# Patient Record
Sex: FEMALE | Race: WHITE | NOT HISPANIC OR LATINO | Employment: OTHER | URBAN - METROPOLITAN AREA
[De-identification: names, ages, dates, MRNs, and addresses within clinical notes are randomized per-mention and may not be internally consistent; named-entity substitution may affect disease eponyms.]

---

## 2023-02-22 LAB
ALANINE AMINOTRANSFERASE (SGPT) (U/L) IN SER/PLAS: 13 U/L (ref 7–45)
ALANINE AMINOTRANSFERASE (SGPT) (U/L) IN SER/PLAS: 15 U/L (ref 7–45)
ALBUMIN (G/DL) IN SER/PLAS: 4.3 G/DL (ref 3.4–5)
ALBUMIN (G/DL) IN SER/PLAS: 4.4 G/DL (ref 3.4–5)
ALKALINE PHOSPHATASE (U/L) IN SER/PLAS: 86 U/L (ref 33–136)
ALKALINE PHOSPHATASE (U/L) IN SER/PLAS: 89 U/L (ref 33–136)
ANION GAP IN SER/PLAS: 13 MMOL/L (ref 10–20)
ANION GAP IN SER/PLAS: 14 MMOL/L (ref 10–20)
ASPARTATE AMINOTRANSFERASE (SGOT) (U/L) IN SER/PLAS: 19 U/L (ref 9–39)
ASPARTATE AMINOTRANSFERASE (SGOT) (U/L) IN SER/PLAS: 19 U/L (ref 9–39)
BASOPHILS (10*3/UL) IN BLOOD BY AUTOMATED COUNT: 0.05 X10E9/L (ref 0–0.1)
BASOPHILS/100 LEUKOCYTES IN BLOOD BY AUTOMATED COUNT: 0.9 % (ref 0–2)
BILIRUBIN TOTAL (MG/DL) IN SER/PLAS: 0.5 MG/DL (ref 0–1.2)
BILIRUBIN TOTAL (MG/DL) IN SER/PLAS: 0.5 MG/DL (ref 0–1.2)
CALCIDIOL (25 OH VITAMIN D3) (NG/ML) IN SER/PLAS: 72 NG/ML
CALCIUM (MG/DL) IN SER/PLAS: 9.4 MG/DL (ref 8.6–10.6)
CALCIUM (MG/DL) IN SER/PLAS: 9.6 MG/DL (ref 8.6–10.6)
CARBON DIOXIDE, TOTAL (MMOL/L) IN SER/PLAS: 26 MMOL/L (ref 21–32)
CARBON DIOXIDE, TOTAL (MMOL/L) IN SER/PLAS: 27 MMOL/L (ref 21–32)
CHLORIDE (MMOL/L) IN SER/PLAS: 106 MMOL/L (ref 98–107)
CHLORIDE (MMOL/L) IN SER/PLAS: 107 MMOL/L (ref 98–107)
CHOLESTEROL (MG/DL) IN SER/PLAS: 191 MG/DL (ref 0–199)
CHOLESTEROL IN HDL (MG/DL) IN SER/PLAS: 80.5 MG/DL
CHOLESTEROL/HDL RATIO: 2.4
CREATININE (MG/DL) IN SER/PLAS: 0.94 MG/DL (ref 0.5–1.05)
CREATININE (MG/DL) IN SER/PLAS: 0.96 MG/DL (ref 0.5–1.05)
EOSINOPHILS (10*3/UL) IN BLOOD BY AUTOMATED COUNT: 0.11 X10E9/L (ref 0–0.4)
EOSINOPHILS/100 LEUKOCYTES IN BLOOD BY AUTOMATED COUNT: 2 % (ref 0–6)
ERYTHROCYTE DISTRIBUTION WIDTH (RATIO) BY AUTOMATED COUNT: 12.3 % (ref 11.5–14.5)
ERYTHROCYTE MEAN CORPUSCULAR HEMOGLOBIN CONCENTRATION (G/DL) BY AUTOMATED: 31.4 G/DL (ref 32–36)
ERYTHROCYTE MEAN CORPUSCULAR VOLUME (FL) BY AUTOMATED COUNT: 95 FL (ref 80–100)
ERYTHROCYTES (10*6/UL) IN BLOOD BY AUTOMATED COUNT: 4.44 X10E12/L (ref 4–5.2)
ESTIMATED AVERAGE GLUCOSE FOR HBA1C: 108 MG/DL
GFR FEMALE: 62 ML/MIN/1.73M2
GFR FEMALE: 63 ML/MIN/1.73M2
GLUCOSE (MG/DL) IN SER/PLAS: 98 MG/DL (ref 74–99)
GLUCOSE (MG/DL) IN SER/PLAS: 99 MG/DL (ref 74–99)
HEMATOCRIT (%) IN BLOOD BY AUTOMATED COUNT: 42.4 % (ref 36–46)
HEMOGLOBIN (G/DL) IN BLOOD: 13.3 G/DL (ref 12–16)
HEMOGLOBIN A1C/HEMOGLOBIN TOTAL IN BLOOD: 5.4 %
IGA (MG/DL) IN SER/PLAS: 236 MG/DL (ref 70–400)
IGG (MG/DL) IN SER/PLAS: 833 MG/DL (ref 700–1600)
IGM (MG/DL) IN SER/PLAS: 215 MG/DL (ref 40–230)
IMMATURE GRANULOCYTES/100 LEUKOCYTES IN BLOOD BY AUTOMATED COUNT: 0.4 % (ref 0–0.9)
IMMUNOGLOBULIN LIGHT CHAINS KAPPA/LAMBDA (MASS RATIO) IN SERUM: 2.81 (ref 0.26–1.65)
IMMUNOGLOBULIN LIGHT CHAINS.KAPPA (MG/DL) IN SERUM: 4.61 MG/DL (ref 0.33–1.94)
IMMUNOGLOBULIN LIGHT CHAINS.LAMBDA (MG/DL) IN SERUM: 1.64 MG/DL (ref 0.57–2.63)
INSULIN: 10 UIU/ML (ref 3–25)
IRON (UG/DL) IN SER/PLAS: 94 UG/DL (ref 35–150)
IRON BINDING CAPACITY (UG/DL) IN SER/PLAS: 307 UG/DL (ref 240–445)
IRON SATURATION (%) IN SER/PLAS: 31 % (ref 25–45)
LACTATE DEHYDROGENASE (U/L) IN SER/PLAS BY LAC->PYR RXN: 145 U/L (ref 84–246)
LDL: 101 MG/DL (ref 0–99)
LEUKOCYTES (10*3/UL) IN BLOOD BY AUTOMATED COUNT: 5.5 X10E9/L (ref 4.4–11.3)
LYMPHOCYTES (10*3/UL) IN BLOOD BY AUTOMATED COUNT: 1.87 X10E9/L (ref 0.8–3)
LYMPHOCYTES/100 LEUKOCYTES IN BLOOD BY AUTOMATED COUNT: 34.2 % (ref 13–44)
MONOCYTES (10*3/UL) IN BLOOD BY AUTOMATED COUNT: 0.41 X10E9/L (ref 0.05–0.8)
MONOCYTES/100 LEUKOCYTES IN BLOOD BY AUTOMATED COUNT: 7.5 % (ref 2–10)
NEUTROPHILS (10*3/UL) IN BLOOD BY AUTOMATED COUNT: 3.01 X10E9/L (ref 1.6–5.5)
NEUTROPHILS/100 LEUKOCYTES IN BLOOD BY AUTOMATED COUNT: 55 % (ref 40–80)
NRBC (PER 100 WBCS) BY AUTOMATED COUNT: 0 /100 WBC (ref 0–0)
PLATELETS (10*3/UL) IN BLOOD AUTOMATED COUNT: 291 X10E9/L (ref 150–450)
POTASSIUM (MMOL/L) IN SER/PLAS: 4.4 MMOL/L (ref 3.5–5.3)
POTASSIUM (MMOL/L) IN SER/PLAS: 4.6 MMOL/L (ref 3.5–5.3)
PROTEIN TOTAL: 6.8 G/DL (ref 6.4–8.2)
PROTEIN TOTAL: 6.9 G/DL (ref 6.4–8.2)
SODIUM (MMOL/L) IN SER/PLAS: 142 MMOL/L (ref 136–145)
SODIUM (MMOL/L) IN SER/PLAS: 142 MMOL/L (ref 136–145)
THYROTROPIN (MIU/L) IN SER/PLAS BY DETECTION LIMIT <= 0.05 MIU/L: 0.32 MIU/L (ref 0.44–3.98)
THYROXINE (T4) FREE (NG/DL) IN SER/PLAS: 1.19 NG/DL (ref 0.78–1.48)
TRIGLYCERIDE (MG/DL) IN SER/PLAS: 50 MG/DL (ref 0–149)
UREA NITROGEN (MG/DL) IN SER/PLAS: 20 MG/DL (ref 6–23)
UREA NITROGEN (MG/DL) IN SER/PLAS: 20 MG/DL (ref 6–23)
VLDL: 10 MG/DL (ref 0–40)

## 2023-02-25 LAB
ALBUMIN ELP: 4.1 G/DL (ref 3.4–5)
ALPHA 1: 0.2 G/DL (ref 0.2–0.6)
ALPHA 2: 0.7 G/DL (ref 0.4–1.1)
BETA: 1 G/DL (ref 0.5–1.2)
GAMMA GLOBULIN: 0.8 G/DL (ref 0.5–1.4)
M-PROTEIN 1: 0.1 G/DL
M-PROTEIN 2: 0.1 G/DL
PATH REVIEW - SERUM IMMUNOFIXATION: NORMAL
PATH REVIEW-SERUM PROTEIN ELECTROPHORESIS: NORMAL
PROTEIN ELECTROPHORESIS INTERPRETATION: ABNORMAL
PROTEIN TOTAL: 6.8 G/DL (ref 6.4–8.2)
SERUM IMMUNOFIXATION INTERPRETATION: ABNORMAL

## 2023-08-04 LAB
ALANINE AMINOTRANSFERASE (SGPT) (U/L) IN SER/PLAS: 9 U/L (ref 7–45)
ALBUMIN (G/DL) IN SER/PLAS: 4.2 G/DL (ref 3.4–5)
ALKALINE PHOSPHATASE (U/L) IN SER/PLAS: 79 U/L (ref 33–136)
ANION GAP IN SER/PLAS: 12 MMOL/L (ref 10–20)
ASPARTATE AMINOTRANSFERASE (SGOT) (U/L) IN SER/PLAS: 16 U/L (ref 9–39)
BASOPHILS (10*3/UL) IN BLOOD BY AUTOMATED COUNT: 0.05 X10E9/L (ref 0–0.1)
BASOPHILS/100 LEUKOCYTES IN BLOOD BY AUTOMATED COUNT: 0.9 % (ref 0–2)
BILIRUBIN TOTAL (MG/DL) IN SER/PLAS: 0.6 MG/DL (ref 0–1.2)
CALCIUM (MG/DL) IN SER/PLAS: 9.7 MG/DL (ref 8.6–10.6)
CARBON DIOXIDE, TOTAL (MMOL/L) IN SER/PLAS: 29 MMOL/L (ref 21–32)
CHLORIDE (MMOL/L) IN SER/PLAS: 105 MMOL/L (ref 98–107)
CREATININE (MG/DL) IN SER/PLAS: 0.82 MG/DL (ref 0.5–1.05)
EOSINOPHILS (10*3/UL) IN BLOOD BY AUTOMATED COUNT: 0.11 X10E9/L (ref 0–0.4)
EOSINOPHILS/100 LEUKOCYTES IN BLOOD BY AUTOMATED COUNT: 2 % (ref 0–6)
ERYTHROCYTE DISTRIBUTION WIDTH (RATIO) BY AUTOMATED COUNT: 13 % (ref 11.5–14.5)
ERYTHROCYTE MEAN CORPUSCULAR HEMOGLOBIN CONCENTRATION (G/DL) BY AUTOMATED: 31.4 G/DL (ref 32–36)
ERYTHROCYTE MEAN CORPUSCULAR VOLUME (FL) BY AUTOMATED COUNT: 97 FL (ref 80–100)
ERYTHROCYTES (10*6/UL) IN BLOOD BY AUTOMATED COUNT: 4.17 X10E12/L (ref 4–5.2)
GFR FEMALE: 74 ML/MIN/1.73M2
GLUCOSE (MG/DL) IN SER/PLAS: 94 MG/DL (ref 74–99)
HEMATOCRIT (%) IN BLOOD BY AUTOMATED COUNT: 40.4 % (ref 36–46)
HEMOGLOBIN (G/DL) IN BLOOD: 12.7 G/DL (ref 12–16)
IGA (MG/DL) IN SER/PLAS: 243 MG/DL (ref 70–400)
IGG (MG/DL) IN SER/PLAS: 858 MG/DL (ref 700–1600)
IGM (MG/DL) IN SER/PLAS: 190 MG/DL (ref 40–230)
IMMATURE GRANULOCYTES/100 LEUKOCYTES IN BLOOD BY AUTOMATED COUNT: 0.2 % (ref 0–0.9)
IMMUNOGLOBULIN LIGHT CHAINS KAPPA/LAMBDA (MASS RATIO) IN SERUM: 2.47 (ref 0.26–1.65)
IMMUNOGLOBULIN LIGHT CHAINS.KAPPA (MG/DL) IN SERUM: 4.23 MG/DL (ref 0.33–1.94)
IMMUNOGLOBULIN LIGHT CHAINS.LAMBDA (MG/DL) IN SERUM: 1.71 MG/DL (ref 0.57–2.63)
LACTATE DEHYDROGENASE (U/L) IN SER/PLAS BY LAC->PYR RXN: 127 U/L (ref 84–246)
LEUKOCYTES (10*3/UL) IN BLOOD BY AUTOMATED COUNT: 5.6 X10E9/L (ref 4.4–11.3)
LYMPHOCYTES (10*3/UL) IN BLOOD BY AUTOMATED COUNT: 1.64 X10E9/L (ref 0.8–3)
LYMPHOCYTES/100 LEUKOCYTES IN BLOOD BY AUTOMATED COUNT: 29.1 % (ref 13–44)
MONOCYTES (10*3/UL) IN BLOOD BY AUTOMATED COUNT: 0.42 X10E9/L (ref 0.05–0.8)
MONOCYTES/100 LEUKOCYTES IN BLOOD BY AUTOMATED COUNT: 7.4 % (ref 2–10)
NEUTROPHILS (10*3/UL) IN BLOOD BY AUTOMATED COUNT: 3.41 X10E9/L (ref 1.6–5.5)
NEUTROPHILS/100 LEUKOCYTES IN BLOOD BY AUTOMATED COUNT: 60.4 % (ref 40–80)
NRBC (PER 100 WBCS) BY AUTOMATED COUNT: 0 /100 WBC (ref 0–0)
PLATELETS (10*3/UL) IN BLOOD AUTOMATED COUNT: 244 X10E9/L (ref 150–450)
POTASSIUM (MMOL/L) IN SER/PLAS: 4.2 MMOL/L (ref 3.5–5.3)
PROTEIN TOTAL: 6.7 G/DL (ref 6.4–8.2)
SODIUM (MMOL/L) IN SER/PLAS: 142 MMOL/L (ref 136–145)
UREA NITROGEN (MG/DL) IN SER/PLAS: 17 MG/DL (ref 6–23)

## 2023-08-07 LAB
ALBUMIN ELP: 4.1 G/DL (ref 3.4–5)
ALPHA 1: 0.2 G/DL (ref 0.2–0.6)
ALPHA 2: 0.6 G/DL (ref 0.4–1.1)
BETA: 1 G/DL (ref 0.5–1.2)
GAMMA GLOBULIN: 0.7 G/DL (ref 0.5–1.4)
M-PROTEIN 1: 0.1 G/DL
M-PROTEIN 2: 0.1 G/DL
PATH REVIEW - SERUM IMMUNOFIXATION: NORMAL
PATH REVIEW-SERUM PROTEIN ELECTROPHORESIS: NORMAL
PROTEIN ELECTROPHORESIS INTERPRETATION: ABNORMAL
PROTEIN TOTAL: 6.7 G/DL (ref 6.4–8.2)
SERUM IMMUNOFIXATION INTERPRETATION: ABNORMAL

## 2023-11-22 ENCOUNTER — HOSPITAL ENCOUNTER (OUTPATIENT)
Dept: RADIOLOGY | Facility: CLINIC | Age: 78
Discharge: HOME | End: 2023-11-22
Payer: MEDICARE

## 2023-11-22 DIAGNOSIS — H90.A22 SENSORINEURAL HEARING LOSS, UNILATERAL, LEFT EAR, WITH RESTRICTED HEARING ON THE CONTRALATERAL SIDE: ICD-10-CM

## 2023-11-22 PROCEDURE — 2550000001 HC RX 255 CONTRASTS: Performed by: OTOLARYNGOLOGY

## 2023-11-22 PROCEDURE — 70553 MRI BRAIN STEM W/O & W/DYE: CPT | Performed by: RADIOLOGY

## 2023-11-22 PROCEDURE — 70553 MRI BRAIN STEM W/O & W/DYE: CPT

## 2023-11-22 PROCEDURE — A9575 INJ GADOTERATE MEGLUMI 0.1ML: HCPCS | Performed by: OTOLARYNGOLOGY

## 2023-11-22 RX ORDER — GADOTERATE MEGLUMINE 376.9 MG/ML
0.2 INJECTION INTRAVENOUS
Status: COMPLETED | OUTPATIENT
Start: 2023-11-22 | End: 2023-11-22

## 2023-11-22 RX ADMIN — GADOTERATE MEGLUMINE 14 ML: 376.9 INJECTION INTRAVENOUS at 12:52

## 2024-02-07 ENCOUNTER — TELEPHONE (OUTPATIENT)
Dept: HEMATOLOGY/ONCOLOGY | Facility: CLINIC | Age: 79
End: 2024-02-07
Payer: MEDICARE

## 2024-02-07 ENCOUNTER — APPOINTMENT (OUTPATIENT)
Dept: LAB | Facility: CLINIC | Age: 79
End: 2024-02-07
Payer: MEDICARE

## 2024-02-07 NOTE — TELEPHONE ENCOUNTER
RN reviewed old system and current system.  Current lab orders noted in old system.  Call returned to patient.  Notified patient of finding.  Patient states she does not have a lab req.  Lab req re-printed.  Patient will come to [facility] tomorrow around 11.  Reviewed hours with patient.  Lab req walked over to lab by RN.  Patient also notified that fasting not neccessary for this lab.  Patient appreciative of call.  Call back instructions reviewed.  Patient verbalized understanding.

## 2024-02-07 NOTE — TELEPHONE ENCOUNTER
Patient has appointment on 02/22/24 and was previous Emerson/Jennifer.  Per Jennifer's note 08/23/23    Plan  1. FUV 6mo with labs 2 weeks before appt (cbcd, cmp, flc, spep+if, igg ebony) or sooner if needed     I had an extensive discussion with the patient regarding the diagnosis and discussed the plan of therapy, including general considerations regarding side effects and outcomes. Pt understood and gave appropriate teach back about the plan of care. All questions  were answered to the patient's satisfaction. The patient is instructed to contact us at any time if questions or problems arise. Thank you for the opportunity to participate in the care of this very pleasant patient.     Total time =30 minutes. 50% or more of this time was spent in counseling and/or coordination of care including reviewing medical history/radiology/labs, examining patient, formulating outlined plan with team, and discussing plan with patient/family. I  reviewed patient's chart including but not limited to labs, imaging, surgical/procedure notes, pathology, hospital notes, doctor's notes.      Patient trying to have labs drawn tomorrow.    Please advise when orders are entered.

## 2024-02-08 ENCOUNTER — LAB (OUTPATIENT)
Dept: LAB | Facility: CLINIC | Age: 79
End: 2024-02-08
Payer: MEDICARE

## 2024-02-08 DIAGNOSIS — D47.2 MONOCLONAL GAMMOPATHY: Primary | ICD-10-CM

## 2024-02-08 LAB
BASOPHILS # BLD AUTO: 0.05 X10*3/UL (ref 0–0.1)
BASOPHILS NFR BLD AUTO: 0.9 %
EOSINOPHIL # BLD AUTO: 0.08 X10*3/UL (ref 0–0.4)
EOSINOPHIL NFR BLD AUTO: 1.5 %
ERYTHROCYTE [DISTWIDTH] IN BLOOD BY AUTOMATED COUNT: 12.7 % (ref 11.5–14.5)
HCT VFR BLD AUTO: 40.9 % (ref 36–46)
HGB BLD-MCNC: 13.4 G/DL (ref 12–16)
IMM GRANULOCYTES # BLD AUTO: 0 X10*3/UL (ref 0–0.5)
IMM GRANULOCYTES NFR BLD AUTO: 0 % (ref 0–0.9)
LYMPHOCYTES # BLD AUTO: 1.84 X10*3/UL (ref 0.8–3)
LYMPHOCYTES NFR BLD AUTO: 33.9 %
MCH RBC QN AUTO: 30.7 PG (ref 26–34)
MCHC RBC AUTO-ENTMCNC: 32.8 G/DL (ref 32–36)
MCV RBC AUTO: 94 FL (ref 80–100)
MONOCYTES # BLD AUTO: 0.31 X10*3/UL (ref 0.05–0.8)
MONOCYTES NFR BLD AUTO: 5.7 %
NEUTROPHILS # BLD AUTO: 3.14 X10*3/UL (ref 1.6–5.5)
NEUTROPHILS NFR BLD AUTO: 58 %
NRBC BLD-RTO: NORMAL /100{WBCS}
PLATELET # BLD AUTO: 217 X10*3/UL (ref 150–450)
RBC # BLD AUTO: 4.36 X10*6/UL (ref 4–5.2)
WBC # BLD AUTO: 5.4 X10*3/UL (ref 4.4–11.3)

## 2024-02-08 PROCEDURE — 80053 COMPREHEN METABOLIC PANEL: CPT | Performed by: INTERNAL MEDICINE

## 2024-02-08 PROCEDURE — 83521 IG LIGHT CHAINS FREE EACH: CPT | Performed by: INTERNAL MEDICINE

## 2024-02-08 PROCEDURE — 86334 IMMUNOFIX E-PHORESIS SERUM: CPT | Performed by: INTERNAL MEDICINE

## 2024-02-08 PROCEDURE — 85025 COMPLETE CBC W/AUTO DIFF WBC: CPT

## 2024-02-08 PROCEDURE — 84165 PROTEIN E-PHORESIS SERUM: CPT | Performed by: INTERNAL MEDICINE

## 2024-02-08 PROCEDURE — 36415 COLL VENOUS BLD VENIPUNCTURE: CPT

## 2024-02-08 PROCEDURE — 86320 SERUM IMMUNOELECTROPHORESIS: CPT | Performed by: INTERNAL MEDICINE

## 2024-02-08 PROCEDURE — 84155 ASSAY OF PROTEIN SERUM: CPT | Mod: 59 | Performed by: INTERNAL MEDICINE

## 2024-02-08 PROCEDURE — 82784 ASSAY IGA/IGD/IGG/IGM EACH: CPT | Performed by: INTERNAL MEDICINE

## 2024-02-09 LAB
ALBUMIN SERPL BCP-MCNC: 4.3 G/DL (ref 3.4–5)
ALP SERPL-CCNC: 86 U/L (ref 33–136)
ALT SERPL W P-5'-P-CCNC: 12 U/L (ref 7–45)
ANION GAP SERPL CALC-SCNC: 15 MMOL/L (ref 10–20)
AST SERPL W P-5'-P-CCNC: 17 U/L (ref 9–39)
BILIRUB SERPL-MCNC: 0.6 MG/DL (ref 0–1.2)
BUN SERPL-MCNC: 25 MG/DL (ref 6–23)
CALCIUM SERPL-MCNC: 9.8 MG/DL (ref 8.6–10.6)
CHLORIDE SERPL-SCNC: 104 MMOL/L (ref 98–107)
CO2 SERPL-SCNC: 26 MMOL/L (ref 21–32)
CREAT SERPL-MCNC: 0.92 MG/DL (ref 0.5–1.05)
EGFRCR SERPLBLD CKD-EPI 2021: 65 ML/MIN/1.73M*2
GLUCOSE SERPL-MCNC: 89 MG/DL (ref 74–99)
IGA SERPL-MCNC: 250 MG/DL (ref 70–400)
IGG SERPL-MCNC: 880 MG/DL (ref 700–1600)
IGM SERPL-MCNC: 196 MG/DL (ref 40–230)
KAPPA LC SERPL-MCNC: 4.12 MG/DL (ref 0.33–1.94)
KAPPA LC/LAMBDA SER: 2.62 {RATIO} (ref 0.26–1.65)
LAMBDA LC SERPL-MCNC: 1.57 MG/DL (ref 0.57–2.63)
POTASSIUM SERPL-SCNC: 4.5 MMOL/L (ref 3.5–5.3)
PROT SERPL-MCNC: 6.9 G/DL (ref 6.4–8.2)
PROT SERPL-MCNC: 6.9 G/DL (ref 6.4–8.2)
SODIUM SERPL-SCNC: 140 MMOL/L (ref 136–145)

## 2024-02-13 LAB
ALBUMIN: 4.2 G/DL (ref 3.4–5)
ALPHA 1 GLOBULIN: 0.2 G/DL (ref 0.2–0.6)
ALPHA 2 GLOBULIN: 0.7 G/DL (ref 0.4–1.1)
BETA GLOBULIN: 1 G/DL (ref 0.5–1.2)
GAMMA GLOBULIN: 0.8 G/DL (ref 0.5–1.4)
IMMUNOFIXATION COMMENT: ABNORMAL
M-PROTEIN 1: 0.1 G/DL
M-PROTEIN 2: 0.1 G/DL
PATH REVIEW - SERUM IMMUNOFIXATION: ABNORMAL
PATH REVIEW-SERUM PROTEIN ELECTROPHORESIS: ABNORMAL
PROTEIN ELECTROPHORESIS COMMENT: ABNORMAL

## 2024-02-19 PROBLEM — M48.061 SPINAL STENOSIS OF LUMBAR REGION: Status: ACTIVE | Noted: 2024-02-19

## 2024-02-19 PROBLEM — E66.9 OBESITY: Status: ACTIVE | Noted: 2019-05-01

## 2024-02-19 PROBLEM — G56.03 CARPAL TUNNEL SYNDROME ON BOTH SIDES: Status: ACTIVE | Noted: 2024-02-19

## 2024-02-19 PROBLEM — D09.9 SQUAMOUS CELL CARCINOMA IN SITU: Status: ACTIVE | Noted: 2024-02-19

## 2024-02-19 PROBLEM — G56.00 CARPAL TUNNEL SYNDROME: Status: ACTIVE | Noted: 2024-02-19

## 2024-02-19 PROBLEM — D22.5 MELANOCYTIC NEVI OF TRUNK: Status: ACTIVE | Noted: 2021-02-12

## 2024-02-19 PROBLEM — U09.9 POST-ACUTE COVID-19 SYNDROME: Status: ACTIVE | Noted: 2022-02-07

## 2024-02-19 PROBLEM — R06.00 DYSPNEA: Status: ACTIVE | Noted: 2024-02-19

## 2024-02-19 PROBLEM — R55 SYNCOPE: Status: ACTIVE | Noted: 2023-05-25

## 2024-02-19 PROBLEM — R10.9 ABDOMINAL PAIN: Status: ACTIVE | Noted: 2021-08-24

## 2024-02-19 PROBLEM — M79.7 FIBROMYALGIA: Status: ACTIVE | Noted: 2024-02-19

## 2024-02-19 PROBLEM — D18.01 HEMANGIOMA OF SKIN AND SUBCUTANEOUS TISSUE: Status: ACTIVE | Noted: 2021-02-12

## 2024-02-19 PROBLEM — L90.5 SCAR CONDITION AND FIBROSIS OF SKIN: Status: ACTIVE | Noted: 2021-05-20

## 2024-02-19 PROBLEM — E55.9 VITAMIN D DEFICIENCY: Status: ACTIVE | Noted: 2024-02-19

## 2024-02-19 PROBLEM — E53.8 VITAMIN B12 DEFICIENCY WITHOUT ANEMIA: Status: ACTIVE | Noted: 2024-02-19

## 2024-02-19 PROBLEM — M24.511: Status: ACTIVE | Noted: 2019-12-18

## 2024-02-19 PROBLEM — K57.30 DIVERTICULAR DISEASE OF COLON: Status: ACTIVE | Noted: 2024-02-19

## 2024-02-19 PROBLEM — I51.3 MURAL THROMBUS OF LEFT VENTRICLE: Status: ACTIVE | Noted: 2019-01-02

## 2024-02-19 PROBLEM — Z80.8 FAMILY HISTORY OF MALIGNANT NEOPLASM OF OTHER ORGANS OR SYSTEMS: Status: ACTIVE | Noted: 2021-05-20

## 2024-02-19 PROBLEM — F31.9 BIPOLAR DISEASE, CHRONIC (MULTI): Status: ACTIVE | Noted: 2024-02-19

## 2024-02-19 PROBLEM — L81.9 DISORDER OF PIGMENTATION, UNSPECIFIED: Status: ACTIVE | Noted: 2021-05-20

## 2024-02-19 PROBLEM — K80.20 CHOLELITHIASIS WITHOUT OBSTRUCTION: Status: ACTIVE | Noted: 2018-07-19

## 2024-02-19 PROBLEM — G47.33 OBSTRUCTIVE SLEEP APNEA SYNDROME: Status: ACTIVE | Noted: 2024-02-19

## 2024-02-19 PROBLEM — R21 RASH: Status: ACTIVE | Noted: 2024-02-19

## 2024-02-19 PROBLEM — L82.1 OTHER SEBORRHEIC KERATOSIS: Status: ACTIVE | Noted: 2021-05-20

## 2024-02-19 PROBLEM — L81.4 OTHER MELANIN HYPERPIGMENTATION: Status: ACTIVE | Noted: 2021-02-12

## 2024-02-19 PROBLEM — R74.8 ELEVATED LIVER ENZYMES: Status: ACTIVE | Noted: 2021-08-24

## 2024-02-19 PROBLEM — L57.9 SKIN CHANGES DUE TO CHRONIC EXPOSURE TO NONIONIZING RADIATION, UNSPECIFIED: Status: ACTIVE | Noted: 2021-05-20

## 2024-02-19 PROBLEM — T78.2XXA ANAPHYLAXIS: Status: ACTIVE | Noted: 2018-09-12

## 2024-02-19 PROBLEM — L91.0 HYPERTROPHIC SCAR: Status: ACTIVE | Noted: 2021-03-07

## 2024-02-19 PROBLEM — S46.101A INJURY OF TENDON OF LONG HEAD OF BICEPS, RIGHT, INITIAL ENCOUNTER: Status: ACTIVE | Noted: 2019-12-18

## 2024-02-19 PROBLEM — M79.7 FIBROMYOSITIS: Status: ACTIVE | Noted: 2018-02-02

## 2024-02-19 PROBLEM — R74.8 ALKALINE PHOSPHATASE ELEVATION: Status: ACTIVE | Noted: 2018-05-21

## 2024-02-19 PROBLEM — Z85.828 PERSONAL HISTORY OF OTHER MALIGNANT NEOPLASM OF SKIN: Status: ACTIVE | Noted: 2021-05-20

## 2024-02-19 PROBLEM — E78.2 MIXED HYPERLIPIDEMIA: Status: ACTIVE | Noted: 2019-01-31

## 2024-02-19 PROBLEM — J06.9 UPPER RESPIRATORY INFECTION: Status: ACTIVE | Noted: 2024-02-19

## 2024-02-19 PROBLEM — H04.123 DRY EYES: Status: ACTIVE | Noted: 2019-04-03

## 2024-02-19 PROBLEM — R35.0 INCREASED FREQUENCY OF URINATION: Status: ACTIVE | Noted: 2024-02-19

## 2024-02-19 PROBLEM — R73.01 IMPAIRED FASTING GLUCOSE: Status: ACTIVE | Noted: 2021-09-22

## 2024-02-19 PROBLEM — M35.00 SJOGREN'S SYNDROME (MULTI): Status: ACTIVE | Noted: 2018-03-14

## 2024-02-19 PROBLEM — L91.8 OTHER HYPERTROPHIC DISORDERS OF THE SKIN: Status: ACTIVE | Noted: 2021-02-12

## 2024-02-19 PROBLEM — D22.71 MELANOCYTIC NEVI OF RIGHT LOWER LIMB, INCLUDING HIP: Status: ACTIVE | Noted: 2021-02-12

## 2024-02-19 PROBLEM — D48.5 NEOPLASM OF UNCERTAIN BEHAVIOR OF SKIN: Status: ACTIVE | Noted: 2021-05-20

## 2024-02-19 PROBLEM — D12.6 BENIGN NEOPLASM OF COLON: Status: ACTIVE | Noted: 2024-02-19

## 2024-02-19 PROBLEM — J40 BRONCHITIS: Status: ACTIVE | Noted: 2024-02-19

## 2024-02-19 PROBLEM — M19.011 PRIMARY OSTEOARTHRITIS OF RIGHT SHOULDER: Status: ACTIVE | Noted: 2019-12-11

## 2024-02-19 PROBLEM — S16.1XXA STRAIN OF NECK MUSCLE: Status: ACTIVE | Noted: 2024-02-19

## 2024-02-19 PROBLEM — E05.20 TOXIC MULTINODULAR GOITER: Status: ACTIVE | Noted: 2024-02-19

## 2024-02-19 PROBLEM — Z86.16 HISTORY OF SEVERE ACUTE RESPIRATORY SYNDROME CORONAVIRUS 2 (SARS-COV-2) DISEASE: Status: ACTIVE | Noted: 2021-12-14

## 2024-02-19 PROBLEM — D22.60 MELANOCYTIC NEVI OF UNSPECIFIED UPPER LIMB, INCLUDING SHOULDER: Status: ACTIVE | Noted: 2021-02-12

## 2024-02-19 PROBLEM — G56.20 ULNAR NEUROPATHY: Status: ACTIVE | Noted: 2024-02-19

## 2024-02-19 PROBLEM — N87.9 CERVICAL DYSPLASIA: Status: ACTIVE | Noted: 2018-03-14

## 2024-02-19 PROBLEM — M85.80 OSTEOPENIA: Status: ACTIVE | Noted: 2017-12-27

## 2024-02-19 PROBLEM — S39.012A LOW BACK STRAIN: Status: ACTIVE | Noted: 2024-02-19

## 2024-02-19 PROBLEM — R05.9 COUGH, UNSPECIFIED: Status: ACTIVE | Noted: 2023-12-28

## 2024-02-19 PROBLEM — D48.5 NEOPLASM OF UNCERTAIN BEHAVIOR OF SKIN: Status: ACTIVE | Noted: 2020-11-01

## 2024-02-19 PROBLEM — Z12.83 ENCOUNTER FOR SCREENING FOR MALIGNANT NEOPLASM OF SKIN: Status: ACTIVE | Noted: 2018-02-02

## 2024-02-19 PROBLEM — M47.816 LUMBAR SPONDYLOSIS: Status: ACTIVE | Noted: 2018-05-21

## 2024-02-19 PROBLEM — Z96.611 STATUS POST TOTAL SHOULDER ARTHROPLASTY, RIGHT: Status: ACTIVE | Noted: 2019-12-18

## 2024-02-19 PROBLEM — I73.00 RAYNAUD'S DISEASE: Status: ACTIVE | Noted: 2024-02-19

## 2024-02-19 PROBLEM — R07.9 CHEST PAIN: Status: ACTIVE | Noted: 2024-02-19

## 2024-02-19 PROBLEM — M79.672 LEFT FOOT PAIN: Status: ACTIVE | Noted: 2024-02-19

## 2024-02-19 PROBLEM — H91.93 BILATERAL HEARING LOSS: Status: ACTIVE | Noted: 2024-02-19

## 2024-02-19 PROBLEM — R11.2 NAUSEA AND VOMITING: Status: ACTIVE | Noted: 2021-08-24

## 2024-02-19 PROBLEM — M50.90 DISORDER OF INTERVERTEBRAL DISC OF CERVICAL SPINE: Status: ACTIVE | Noted: 2024-02-19

## 2024-02-19 PROBLEM — Z92.25 PERSONAL HISTORY OF IMMUNOSUPRESSION THERAPY: Status: ACTIVE | Noted: 2018-02-02

## 2024-02-19 PROBLEM — S93.602A SPRAIN OF LEFT FOOT: Status: ACTIVE | Noted: 2024-02-19

## 2024-02-19 PROBLEM — D22.30 MELANOCYTIC NEVI OF UNSPECIFIED PART OF FACE: Status: ACTIVE | Noted: 2020-11-01

## 2024-02-19 PROBLEM — H90.5 SENSORINEURAL HEARING LOSS: Status: ACTIVE | Noted: 2022-05-19

## 2024-02-19 PROBLEM — G47.30 SLEEP APNEA: Status: ACTIVE | Noted: 2024-02-19

## 2024-02-19 PROBLEM — E03.9 HYPOTHYROID: Status: ACTIVE | Noted: 2024-02-19

## 2024-02-19 PROBLEM — E04.2 NON-TOXIC MULTINODULAR GOITER: Status: ACTIVE | Noted: 2024-02-19

## 2024-02-19 PROBLEM — M19.90 ARTHRITIS: Status: ACTIVE | Noted: 2024-02-19

## 2024-02-19 PROBLEM — D22.9 MELANOCYTIC NEVI, UNSPECIFIED: Status: ACTIVE | Noted: 2018-10-01

## 2024-02-19 PROBLEM — D47.2 MGUS (MONOCLONAL GAMMOPATHY OF UNKNOWN SIGNIFICANCE): Status: ACTIVE | Noted: 2018-02-02

## 2024-02-19 PROBLEM — K58.9 IRRITABLE BOWEL SYNDROME: Status: ACTIVE | Noted: 2024-02-19

## 2024-02-19 PROBLEM — K21.9 GASTROESOPHAGEAL REFLUX DISEASE: Status: ACTIVE | Noted: 2024-02-19

## 2024-02-19 PROBLEM — D22.9 MELANOCYTIC NEVI, UNSPECIFIED: Status: ACTIVE | Noted: 2021-02-12

## 2024-02-19 PROBLEM — B35.4 TINEA CORPORIS: Status: ACTIVE | Noted: 2017-08-25

## 2024-02-19 RX ORDER — WHEAT DEXTRIN 3 G/4 G
POWDER (GRAM) ORAL
COMMUNITY
Start: 2023-03-16

## 2024-02-19 RX ORDER — NABUMETONE 750 MG/1
TABLET, FILM COATED ORAL
COMMUNITY
End: 2024-05-10 | Stop reason: WASHOUT

## 2024-02-19 RX ORDER — FLUTICASONE PROPIONATE 50 MCG
SPRAY, SUSPENSION (ML) NASAL
COMMUNITY
Start: 2023-10-30

## 2024-02-19 RX ORDER — OMEPRAZOLE 40 MG/1
1 CAPSULE, DELAYED RELEASE ORAL
COMMUNITY
Start: 2024-01-03 | End: 2025-01-03

## 2024-02-19 RX ORDER — METRONIDAZOLE 250 MG/1
TABLET ORAL EVERY 8 HOURS
COMMUNITY
Start: 2024-01-03 | End: 2024-05-10 | Stop reason: WASHOUT

## 2024-02-19 RX ORDER — LEVOTHYROXINE SODIUM 50 UG/1
TABLET ORAL
COMMUNITY
Start: 2023-12-30

## 2024-02-19 RX ORDER — EPINEPHRINE 0.3 MG/.3ML
INJECTION INTRAMUSCULAR
COMMUNITY
Start: 2023-07-21

## 2024-02-19 RX ORDER — FERROUS SULFATE 325(65) MG
TABLET ORAL
COMMUNITY
End: 2024-05-10 | Stop reason: WASHOUT

## 2024-02-19 RX ORDER — PANTOPRAZOLE SODIUM 40 MG/1
TABLET, DELAYED RELEASE ORAL
COMMUNITY
End: 2024-05-10 | Stop reason: WASHOUT

## 2024-02-19 RX ORDER — HYDROXYCHLOROQUINE SULFATE 200 MG/1
300 TABLET, FILM COATED ORAL
COMMUNITY
Start: 2023-12-05

## 2024-02-19 RX ORDER — PAROXETINE HYDROCHLORIDE 20 MG/1
TABLET, FILM COATED ORAL
COMMUNITY
End: 2024-05-10 | Stop reason: WASHOUT

## 2024-02-19 RX ORDER — MEDROXYPROGESTERONE ACETATE 2.5 MG/1
TABLET ORAL
COMMUNITY
End: 2024-05-10 | Stop reason: WASHOUT

## 2024-02-19 RX ORDER — ERGOCALCIFEROL 1.25 MG/1
CAPSULE ORAL
COMMUNITY

## 2024-02-19 RX ORDER — DULOXETIN HYDROCHLORIDE 30 MG/1
CAPSULE, DELAYED RELEASE ORAL
COMMUNITY
End: 2024-05-10 | Stop reason: WASHOUT

## 2024-02-19 RX ORDER — CYCLOSPORINE 0.5 MG/ML
EMULSION OPHTHALMIC
COMMUNITY

## 2024-02-19 RX ORDER — IBANDRONATE SODIUM 150 MG/1
TABLET, FILM COATED ORAL
COMMUNITY
End: 2024-05-10 | Stop reason: WASHOUT

## 2024-02-19 RX ORDER — METHOCARBAMOL 750 MG/1
TABLET, FILM COATED ORAL
COMMUNITY
End: 2024-05-10 | Stop reason: WASHOUT

## 2024-02-19 RX ORDER — DICLOFENAC SODIUM 10 MG/G
GEL TOPICAL
COMMUNITY
Start: 2020-12-23 | End: 2024-05-10 | Stop reason: WASHOUT

## 2024-02-21 ENCOUNTER — APPOINTMENT (OUTPATIENT)
Dept: HEMATOLOGY/ONCOLOGY | Facility: CLINIC | Age: 79
End: 2024-02-21
Payer: MEDICARE

## 2024-02-22 ENCOUNTER — OFFICE VISIT (OUTPATIENT)
Dept: HEMATOLOGY/ONCOLOGY | Facility: CLINIC | Age: 79
End: 2024-02-22
Payer: MEDICARE

## 2024-02-22 ENCOUNTER — DOCUMENTATION (OUTPATIENT)
Dept: HEMATOLOGY/ONCOLOGY | Facility: CLINIC | Age: 79
End: 2024-02-22

## 2024-02-22 VITALS
TEMPERATURE: 98.1 F | RESPIRATION RATE: 16 BRPM | HEART RATE: 77 BPM | OXYGEN SATURATION: 96 % | DIASTOLIC BLOOD PRESSURE: 70 MMHG | WEIGHT: 166.45 LBS | BODY MASS INDEX: 28.07 KG/M2 | SYSTOLIC BLOOD PRESSURE: 120 MMHG

## 2024-02-22 DIAGNOSIS — D47.2 MGUS (MONOCLONAL GAMMOPATHY OF UNKNOWN SIGNIFICANCE): Primary | ICD-10-CM

## 2024-02-22 PROCEDURE — 99204 OFFICE O/P NEW MOD 45 MIN: CPT | Performed by: INTERNAL MEDICINE

## 2024-02-22 PROCEDURE — 1159F MED LIST DOCD IN RCRD: CPT | Performed by: INTERNAL MEDICINE

## 2024-02-22 PROCEDURE — 1036F TOBACCO NON-USER: CPT | Performed by: INTERNAL MEDICINE

## 2024-02-22 PROCEDURE — 1125F AMNT PAIN NOTED PAIN PRSNT: CPT | Performed by: INTERNAL MEDICINE

## 2024-02-22 PROCEDURE — 99214 OFFICE O/P EST MOD 30 MIN: CPT | Performed by: INTERNAL MEDICINE

## 2024-02-22 ASSESSMENT — PAIN SCALES - GENERAL: PAINLEVEL: 6

## 2024-02-22 ASSESSMENT — ENCOUNTER SYMPTOMS
DEPRESSION: 0
LOSS OF SENSATION IN FEET: 0
OCCASIONAL FEELINGS OF UNSTEADINESS: 0

## 2024-02-22 NOTE — PROGRESS NOTES
Patient seen by Dr. Douglass today in clinic. Reinforcement education provided regarding next steps with plan of care.  Office contact information provided to patient.  Mammogram and FUV with dr. Douglass in 6 mos.  Patient verbalizes understanding of plan of care via teachback method.

## 2024-02-22 NOTE — PROGRESS NOTES
LORRAINE SHANKS is a 77 year old Female        Chief Complaint: Monoclonal gammopathy of unknown  significance   Interval History:    Location: [facility]  DX: MGUS  TX: Monitor     February 22, 2024- Today patient presents to clinic for transfer of care from Dr. Norman to Jennifer Ha, APRN-CNP to Dr. Douglass. Last appt with Dr. Norman 2/2023  The patient has not noted fever, chills, drenching night sweats, infectious symptoms, lymphadenopathy, weight loss, bone pain, hemorrhage from any site, melena, or respiratory symptoms. No new issues.   up to date on cancer screenings.      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 Most recently she has been found to have a kappa light chain abnormality as well.   She has never had any progressive changes in the bone, renal insufficiency, anemia, or hypercalcemia. She is not even symptomatic from disease.    FAMILY HISTORY: There is colon cancer in several family members. No underlying hematologic abnormalities have been appreciated within the family. There is a family history of psychiatric disease as well.   PMH: Sjogren's dx, fibromyalgia, hypothyroid, oa, vertigo         Review of Systems:   Review of Systems:    10 point review of systems negative except as state in HPI.                  Allergies and Intolerances:       Allergies:         erythromycin: Drug, Unknown, Active         macrolide antibiotics: Drug Category, Unknown, Active     Outpatient Medication Profile:  * Patient Currently Takes Medications as of 23-Aug-2023 14:21 documented in Structured Notes         estradiol 0.1 mg/g vaginal cream : Last Dose Taken:  , 1 gram(s) intravaginally three nights a week, Start Date: 14-Apr-2023         VITAMIN D 3: Last Dose Taken:  , 2500  buccally         omeprazole 40 mg oral delayed release capsule: Last Dose Taken:  , every  other day          ferrous sulfate 325 mg (65 mg  elemental iron) oral tablet: Last Dose  Taken:  , 1 tab(s) orally every other day         Plaquenil 200 mg oral tablet: Last Dose Taken:  , 1 tab(s) orally once  a day         levothyroxine 50 mcg (0.05 mg) oral tablet: Last Dose Taken:  , 1 tab(s)  orally once a day         Restasis 0.05% ophthalmic emulsion: Last Dose Taken:  , 1 drop(s) to each  affected eye every 12 hours             Medical History:         Abnormal Pap smear of cervix: ICD-10: R87.619, Status: Active         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, Status: Active         Atypical squamous cells of undetermined significance (ASCUS) on Papanicolaou smear of cervix : ICD-10: R87.610, Status: Active         Vaginitis, atrophic: ICD-10: N95.2, Status: Active         Cervical dysplasia, mild: ICD-10: N87.0, Status: Active         Osteopenia: ICD-10: M85.80, Status: Active         Sjogren's syndrome: ICD-10: M35.00, Status: Active         Monoclonal gammopathy of unknown significance: ICD-10: D47.2,  Status: Active         Bipolar I disorder: ICD-10: F31.9, Status: Active         Gastroesophageal reflux disease without esophagitis: ICD-10:  K21.9, Status: Active         Fibromyalgia: ICD-10: M79.7, Status: Active         Acquired hypothyroidism: ICD-10: E03.9, Status: Active         Personal history of immunosuppressive therapy: ICD-10: Z92.25,  Status: Active         HPV test positive: ICD-10: R88.8, Status: Active        Social History:   Social Substance History:  ·  Social History denies smoking, alcohol and drug use   ·  Smoking Status never smoker (1)            Vitals and Measurements:   Vitals: Temp: 36.2  HR: 76  RR: 18  BP: 112/72  SPO2%:   98   Measurements: HT(cm): 164  WT(kg): 71.7  BSA: 1.8   BMI:  26.6   Last 3 Weights & Heights: Date:                           Weight/Scale Type:                    Height:   23-Aug-2023 13:47                71.7  kg                     164  cm  14-Apr-2023 12:57                78.8  kg                      164  cm  27-Feb-2023 10:55                80.2  kg                     164  cm         Physical Exam:      Constitutional: Well developed, awake/alert/oriented  x3, no distress, alert and cooperative      Eyes: clear sclera      ENMT: mucous membranes moist   Head/Neck: Neck supple, no apparent injury, trachea  midline      Respiratory/Thorax: Patent airways, CTAB, normal  breath sounds with good chest expansion, thorax symmetric      Cardiovascular: Regular, rate and rhythm, no murmurs,  normal S 1and S 2      Gastrointestinal: Abdomen soft nontender, nondistended,  + bowel sounds.   No organomegaly      Musculoskeletal: ROM intact, no joint swelling, normal  strength   Extremities: normal extremities, no cyanosis, no  clubbing, no edema      Neurological: Patient is alert and oriented x3. Nonfocal  exam. No myoclonus      Lymphatic: No significant lymphadenopathy   Psychological: Pleasant, appropriate, and easily  engaged      Skin: Warm and dry, no lesions, no rashes         Lab Results:     ·  Results        CBC date/time       WBC     HGB     HCT     PLT     Neut      03-Aug-2023 12:04   5.6     12.7    40.4    244     3.41  21-Feb-2023 11:32   5.5     13.3    42.4    291     3.01  10-Aug-2022 11:57   5.4     13.1    40.4    234     3.39  24-Nov-2021 14:59   6.7     12.1    39.3    306     4.49  18-Aug-2021 15:22   5.4     13.2    39.6    273     3.11  10-Feb-2021 11:35   6.1     12.9    41.9    238     4.17  24-Aug-2020 15:02   5.4     13.3    41.6    223     3.02     BMP date/time       NA              K               CL              CO2             BUN             CREAT             03-Aug-2023 12:04   142             4.2             105             N/A             17              0.82  21-Feb-2023 11:32   142             4.6             107             N/A             20              0.94  21-Feb-2023 11:23   142             4.4             106             N/A             20              0.96  10-Aug-2022  11:56   142             4.6             104             N/A             17              0.97  11-Feb-2022 11:29   143             4.5             107             N/A             17              0.85  24-Nov-2021 14:59   143             4.3             108(H)          N/A             26(H)           0.80  18-Aug-2021 15:22   140             4.4             104             N/A             24(H)           1.03     LDH date/time       LDH     03-Aug-2023 12:04   127  21-Feb-2023 11:32   145  10-Aug-2022 11:56   156  11-Feb-2022 11:29   133  24-Nov-2021 14:59   164  18-Aug-2021 15:22   148  10-Feb-2021 11:35   146        I have reviewed these laboratory results:     Comprehensive Metabolic Panel  03-Aug-2023 12:04:00       Result Value    Glucose, Serum  94    NA  142    K  4.2    CL  105    Bicarbonate, Serum  29    Anion Gap, Serum  12    BUN  17    CREAT  0.82    GFR Female  74    Calcium, Serum  9.7    ALB  4.2    ALKP  79    T Pro  6.7    T Bili  0.6    Alanine Aminotransferase, Serum  9    Aspartate Transaminase, Serum  16       Complete Blood Count + Differential  03-Aug-2023 12:04:00       Result Value    White Blood Cell Count  5.6    Nucleated Erythrocyte Count  0.0    Red Blood Cell Count  4.17    HGB  12.7    HCT  40.4    MCV  97    MCHC  31.4   L   PLT  244    RDW-CV  13.0    Neutrophil %  60.4    Immature Granulocytes %  0.2    Lymphocyte %  29.1    Monocyte %  7.4    Eosinophil %  2.0    Basophil %  0.9    Neutrophil Count  3.41    Lymphocyte Count  1.64    Monocyte Count  0.42    Eosinophil Count  0.11    Basophil Count  0.05       Kappa/Lambda Quant Free Light Chains  03-Aug-2023 12:04:00       Result Value    Free Kappa Light Chains, S  4.23   H   Free Lambda Light Chains, S  1.71    Free Kappa/Lambda Ratio, S  2.47   H      Immunoglobulins (G,A,M)  03-Aug-2023 12:04:00       Result Value    Immunoglobulin G Level, Serum  858    Immunoglobulin A Level, Serum  243    Immunoglobulin M Level, Serum  190        Path Review SPE  03-Aug-2023 12:04:00       Result Value    Path Review-CLYDE STATON By her/his signature above, the Pathologist   listed as making the final interpretation   certifies that she/he has personally reviewed    this case.  ----------------------------------------------       Protein Electrophoresis + IF, Serum.  03-Aug-2023 12:04:00       Result Value    T Pro  6.7    Albumin Serum  4.1    Alpha 1 Globulin  0.2    Alpha 2 Globulin  0.6    Beta Globulin  1.0    Gamma Globulin  0.7    M-Protein 1  0.1   A   M-Protein 2  0.1   A   Interpretation, Electrophoresis  ABNORMAL    Immunofixation Interpretation, Serum  ABNORMAL       Lactate Dehydrogenase, Serum  03-Aug-2023 12:04:00       Result Value    LDH  127       KATARZYNA Path Reveiw  03-Aug-2023 12:04:00       Result Value    Path Review-KATARZYNA  BRIONNA By her/his signature above, the Pathologist   listed as making the final interpretation   certifies that she/he has personally reviewed    this case.  ----------------------------------------------          Assessment and Plan:         Location: [facility]  DX: MGUS  TX: Monitor     8/23/23 - Today patient presents to clinic for transfer of care from Dr. Norman to Jennifer Ha, APRN-CNP. Last appt with Dr. Norman 2/2023.  Last appointment with Jennifer Ha on August 23, 2023     1. MGUS:    Patient has been followed since January 2007 without any progression to myeloma. The changes are in the context of autoimmune process. She met with Dr. Syed who concurred with this assessment and no treatment recommendations were made.  Every 6 mo labs to continue  No findings on exam of concern  Labs stable.     - cbcd normal. cmp - gfr 74, ca 9.7 (nl). albumin 4.1 (nl). flc ratio 2.47. igg ebony normal. spep - no change - Monoclonal IgA-K at 0.1 and Monoclonal IgM-K at 0.1 (M protein 0.2 total)     2. Cancer screening:  She is up to date with mammograms, colonoscopies, and pap smears. Sees derm as well     Plan  1.  FUV 6mo with labs 2 weeks before appt (cbcd, cmp, flc, spep+if, igg ebony) or sooner if needed     I had an extensive discussion with the patient regarding the diagnosis and discussed the plan of therapy, including general considerations regarding side effects and outcomes. Pt understood and gave appropriate teach back about the plan of care. All questions  were answered to the patient's satisfaction. The patient is instructed to contact us at any time if questions or problems arise. Thank you for the opportunity to participate in the care of this very pleasant patient.

## 2024-03-18 ENCOUNTER — APPOINTMENT (OUTPATIENT)
Dept: RADIOLOGY | Facility: CLINIC | Age: 79
End: 2024-03-18
Payer: MEDICARE

## 2024-03-21 ENCOUNTER — HOSPITAL ENCOUNTER (OUTPATIENT)
Dept: RADIOLOGY | Facility: CLINIC | Age: 79
Discharge: HOME | End: 2024-03-21
Payer: MEDICARE

## 2024-03-21 VITALS — HEIGHT: 65 IN | WEIGHT: 159 LBS | BODY MASS INDEX: 26.49 KG/M2

## 2024-03-21 DIAGNOSIS — D47.2 MGUS (MONOCLONAL GAMMOPATHY OF UNKNOWN SIGNIFICANCE): ICD-10-CM

## 2024-03-21 PROCEDURE — 77067 SCR MAMMO BI INCL CAD: CPT

## 2024-03-21 PROCEDURE — 77067 SCR MAMMO BI INCL CAD: CPT | Performed by: RADIOLOGY

## 2024-03-21 PROCEDURE — 77063 BREAST TOMOSYNTHESIS BI: CPT | Performed by: RADIOLOGY

## 2024-04-26 ENCOUNTER — APPOINTMENT (OUTPATIENT)
Dept: GYNECOLOGIC ONCOLOGY | Facility: CLINIC | Age: 79
End: 2024-04-26
Payer: MEDICARE

## 2024-05-08 NOTE — PROGRESS NOTES
Patient ID: Camille Mcmahan is a 78 y.o. female.  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 Final pathology results c/w CESARIO 1 with positive ectocervical margin.      Patient consistently HPV negative since 4/2014.     11/10/16 pap smear- rare Ascus/HPV positive    1/5/17 colposcopy - [facility]: Squamous epithelium with no significant pathologic findings. Endocervical tissue not identified.     7/6/17 pap smear - ASCUS/HPV positive     2/1/18 pap smear - ASCUS/HPV positive    8/2018 colposcopy-[facility]: CESARIO 1,  no ectocervical aceto white changes. Pap: ASCUS/HPV positive       Objective    Visit Vitals  /72   Pulse 66   Temp 36.6 °C (97.9 °F) (Temporal)   Resp 16        Interval history: Patient is a 78 year old female with a history of CESARIO 1 treated with LEEP in 6/2013.  Here for annual pap.  Last pap 4/2023 was negative, HPV 16/18-.   Was started on Estrace last visit, states she never started using cream.  Patient currently being treated for UTI, following urology.  Patient denies hematuria.  Patient denies any vaginal bleeding, pink tinged discharge. Patient denies any constipation or diarrhea.  Appetite has been good.  Energy levels are baseline. Patient is not currently sexually active. Mammogram is up to date.      Physical Exam:    Constitutional: Doing well. KENDRA  Eyes: PERRL  ENMT: Moist mucus membranes  Head/Neck: Supple. Symmetrical  Cardiovascular: Regular, rate and rhythm. 2+ equal pulses of the extremities  Respiratory/Thorax: CTA. RRR. Chest rise symmetrical.  Gastrointestinal: Non-distended, soft, non-tender  Genitourinary: Cervical borders are obliterated, cervical os visualized, no lesions noted.  Atrophic changes along vaginal walls and introitus.  Small mobile uterus, no CMT  Musculoskeletal: ROM intact, no joint swelling, normal strength  Extremities: No edema  Neurological: Alert and oriented x 3.  Pleasant and cooperative.  Lymphatic: No lymphadenopathy. No lymphedema  Psychological: Appropriate mood and behavior  Skin: Warm and dry, no lesions, no rashes    A complete review of systems was performed and all systems were normal except what is noted in the interval history.          Assessment/Plan   Patient is a 78 year old female with a history of CESARIO 1 treated with LEEP in 6/2013, stable LGSIL changes.  KENDRA 9 years.       PLAN:  Pap today, F/U results  If negative F/U in 1 year or as needed  Physical examination was within normal limits today.  She is currently KENDRA.  We reviewed signs and symptoms of possible recurrence with the patient and she will call our office should she experience any of these.   Encouraged Estrace use

## 2024-05-09 PROBLEM — Z98.890 HISTORY OF SHOULDER SURGERY: Status: ACTIVE | Noted: 2019-12-18

## 2024-05-09 PROBLEM — D22.9 MELANOCYTIC NEVUS: Status: ACTIVE | Noted: 2018-10-01

## 2024-05-09 PROBLEM — S46.101A INJURY OF TENDON OF LONG HEAD OF RIGHT BICEPS: Status: ACTIVE | Noted: 2019-12-18

## 2024-05-09 PROBLEM — D21.9 BENIGN NEOPLASM OF SOFT TISSUE: Status: ACTIVE | Noted: 2024-05-09

## 2024-05-09 PROBLEM — L81.9 ABNORMAL PIGMENTATION OF SKIN: Status: ACTIVE | Noted: 2024-05-09

## 2024-05-09 PROBLEM — R74.8 HIGH ALKALINE PHOSPHATASE: Status: ACTIVE | Noted: 2018-05-21

## 2024-05-09 PROBLEM — L57.0 SENILE HYPERKERATOSIS: Status: ACTIVE | Noted: 2024-05-09

## 2024-05-09 PROBLEM — M24.519 CONTRACTURE OF JOINT OF SHOULDER REGION: Status: ACTIVE | Noted: 2019-12-18

## 2024-05-09 PROBLEM — D22.30 MELANOCYTIC NEVUS OF FACE: Status: ACTIVE | Noted: 2020-11-01

## 2024-05-09 PROBLEM — L91.9 HYPERTROPHIC CONDITION OF SKIN: Status: ACTIVE | Noted: 2021-02-12

## 2024-05-09 PROBLEM — D22.70 MELANOCYTIC NEVUS OF LOWER EXTREMITY: Status: ACTIVE | Noted: 2021-02-12

## 2024-05-09 PROBLEM — D22.5 MELANOCYTIC NEVUS OF TRUNK: Status: ACTIVE | Noted: 2021-02-12

## 2024-05-09 PROBLEM — D22.60 MELANOCYTIC NEVUS OF UPPER EXTREMITY: Status: ACTIVE | Noted: 2021-02-12

## 2024-05-09 RX ORDER — LITHIUM CARBONATE 300 MG/1
CAPSULE ORAL
COMMUNITY
End: 2024-05-10 | Stop reason: WASHOUT

## 2024-05-09 RX ORDER — PROPRANOLOL HYDROCHLORIDE 60 MG/1
CAPSULE, EXTENDED RELEASE ORAL
COMMUNITY
End: 2024-05-10 | Stop reason: WASHOUT

## 2024-05-09 RX ORDER — AZELASTINE 1 MG/ML
SPRAY, METERED NASAL
COMMUNITY

## 2024-05-09 RX ORDER — GABAPENTIN 300 MG/1
CAPSULE ORAL
COMMUNITY
End: 2024-05-10 | Stop reason: WASHOUT

## 2024-05-09 RX ORDER — PREDNISONE 20 MG/1
TABLET ORAL
COMMUNITY
End: 2024-05-10 | Stop reason: WASHOUT

## 2024-05-09 RX ORDER — BUSPIRONE HYDROCHLORIDE 10 MG/1
TABLET ORAL
COMMUNITY
End: 2024-05-10 | Stop reason: WASHOUT

## 2024-05-09 RX ORDER — KETOROLAC TROMETHAMINE 4 MG/ML
SOLUTION/ DROPS OPHTHALMIC
COMMUNITY
End: 2024-05-10 | Stop reason: WASHOUT

## 2024-05-09 RX ORDER — SULFAMETHOXAZOLE AND TRIMETHOPRIM 800; 160 MG/1; MG/1
TABLET ORAL
COMMUNITY
End: 2024-05-10 | Stop reason: WASHOUT

## 2024-05-09 RX ORDER — LAMOTRIGINE 200 MG/1
TABLET ORAL
COMMUNITY
End: 2024-05-10 | Stop reason: WASHOUT

## 2024-05-09 RX ORDER — BENZONATATE 100 MG/1
CAPSULE ORAL
COMMUNITY
End: 2024-05-10 | Stop reason: WASHOUT

## 2024-05-10 ENCOUNTER — OFFICE VISIT (OUTPATIENT)
Dept: GYNECOLOGIC ONCOLOGY | Facility: CLINIC | Age: 79
End: 2024-05-10
Payer: MEDICARE

## 2024-05-10 VITALS
OXYGEN SATURATION: 98 % | HEART RATE: 66 BPM | RESPIRATION RATE: 16 BRPM | SYSTOLIC BLOOD PRESSURE: 117 MMHG | TEMPERATURE: 97.9 F | BODY MASS INDEX: 29.36 KG/M2 | WEIGHT: 173.72 LBS | DIASTOLIC BLOOD PRESSURE: 72 MMHG

## 2024-05-10 DIAGNOSIS — N87.9 CERVICAL DYSPLASIA: Primary | ICD-10-CM

## 2024-05-10 PROCEDURE — 99213 OFFICE O/P EST LOW 20 MIN: CPT | Performed by: NURSE PRACTITIONER

## 2024-05-10 PROCEDURE — 1160F RVW MEDS BY RX/DR IN RCRD: CPT | Performed by: NURSE PRACTITIONER

## 2024-05-10 PROCEDURE — 88141 CYTOPATH C/V INTERPRET: CPT | Performed by: PATHOLOGY

## 2024-05-10 PROCEDURE — 1126F AMNT PAIN NOTED NONE PRSNT: CPT | Performed by: NURSE PRACTITIONER

## 2024-05-10 PROCEDURE — 87624 HPV HI-RISK TYP POOLED RSLT: CPT | Performed by: NURSE PRACTITIONER

## 2024-05-10 PROCEDURE — 88175 CYTOPATH C/V AUTO FLUID REDO: CPT | Mod: TC,GCY | Performed by: NURSE PRACTITIONER

## 2024-05-10 PROCEDURE — 1159F MED LIST DOCD IN RCRD: CPT | Performed by: NURSE PRACTITIONER

## 2024-05-10 ASSESSMENT — PAIN SCALES - GENERAL: PAINLEVEL: 0-NO PAIN

## 2024-05-28 LAB
CYTOLOGY CMNT CVX/VAG CYTO-IMP: NORMAL
HPV HR 12 DNA GENITAL QL NAA+PROBE: NEGATIVE
HPV HR GENOTYPES PNL CVX NAA+PROBE: NEGATIVE
HPV16 DNA SPEC QL NAA+PROBE: NEGATIVE
HPV18 DNA SPEC QL NAA+PROBE: NEGATIVE
LAB AP HPV GENOTYPE QUESTION: YES
LAB AP HPV HR: NORMAL
LABORATORY COMMENT REPORT: NORMAL
PATH REPORT.TOTAL CANCER: NORMAL

## 2024-05-29 ENCOUNTER — TELEPHONE (OUTPATIENT)
Dept: GYNECOLOGIC ONCOLOGY | Facility: HOSPITAL | Age: 79
End: 2024-05-29
Payer: MEDICARE

## 2024-05-29 NOTE — TELEPHONE ENCOUNTER
Phoned patient to notify that pap results are stable from last year and Ml Lovelace CNP recommends keeping follow up as scheduled May 2025.   Message left on patient voice mail.

## 2024-08-08 ENCOUNTER — LAB (OUTPATIENT)
Dept: LAB | Facility: LAB | Age: 79
End: 2024-08-08
Payer: MEDICARE

## 2024-08-08 ENCOUNTER — APPOINTMENT (OUTPATIENT)
Dept: UROLOGY | Facility: CLINIC | Age: 79
End: 2024-08-08
Payer: MEDICARE

## 2024-08-08 VITALS — DIASTOLIC BLOOD PRESSURE: 67 MMHG | HEART RATE: 88 BPM | SYSTOLIC BLOOD PRESSURE: 117 MMHG

## 2024-08-08 DIAGNOSIS — D47.2 MGUS (MONOCLONAL GAMMOPATHY OF UNKNOWN SIGNIFICANCE): ICD-10-CM

## 2024-08-08 DIAGNOSIS — Z87.442 HISTORY OF KIDNEY STONES: ICD-10-CM

## 2024-08-08 LAB
ALBUMIN SERPL BCP-MCNC: 4.1 G/DL (ref 3.4–5)
ALP SERPL-CCNC: 103 U/L (ref 33–136)
ALT SERPL W P-5'-P-CCNC: 11 U/L (ref 7–45)
ANION GAP SERPL CALC-SCNC: 8 MMOL/L (ref 10–20)
AST SERPL W P-5'-P-CCNC: 17 U/L (ref 9–39)
BASOPHILS # BLD AUTO: 0.05 X10*3/UL (ref 0–0.1)
BASOPHILS NFR BLD AUTO: 0.8 %
BILIRUB SERPL-MCNC: 0.5 MG/DL (ref 0–1.2)
BUN SERPL-MCNC: 20 MG/DL (ref 6–23)
CALCIUM SERPL-MCNC: 9.3 MG/DL (ref 8.6–10.3)
CHLORIDE SERPL-SCNC: 105 MMOL/L (ref 98–107)
CO2 SERPL-SCNC: 29 MMOL/L (ref 21–32)
CREAT SERPL-MCNC: 0.91 MG/DL (ref 0.5–1.05)
EGFRCR SERPLBLD CKD-EPI 2021: 66 ML/MIN/1.73M*2
EOSINOPHIL # BLD AUTO: 0.09 X10*3/UL (ref 0–0.4)
EOSINOPHIL NFR BLD AUTO: 1.4 %
ERYTHROCYTE [DISTWIDTH] IN BLOOD BY AUTOMATED COUNT: 12.3 % (ref 11.5–14.5)
FERRITIN SERPL-MCNC: 128 NG/ML (ref 8–150)
GLUCOSE SERPL-MCNC: 83 MG/DL (ref 74–99)
HCT VFR BLD AUTO: 42.1 % (ref 36–46)
HGB BLD-MCNC: 13.4 G/DL (ref 12–16)
IMM GRANULOCYTES # BLD AUTO: 0.02 X10*3/UL (ref 0–0.5)
IMM GRANULOCYTES NFR BLD AUTO: 0.3 % (ref 0–0.9)
IRON SATN MFR SERPL: 24 % (ref 25–45)
IRON SERPL-MCNC: 74 UG/DL (ref 35–150)
LYMPHOCYTES # BLD AUTO: 1.73 X10*3/UL (ref 0.8–3)
LYMPHOCYTES NFR BLD AUTO: 26.5 %
MCH RBC QN AUTO: 29.8 PG (ref 26–34)
MCHC RBC AUTO-ENTMCNC: 31.8 G/DL (ref 32–36)
MCV RBC AUTO: 94 FL (ref 80–100)
MONOCYTES # BLD AUTO: 0.35 X10*3/UL (ref 0.05–0.8)
MONOCYTES NFR BLD AUTO: 5.4 %
NEUTROPHILS # BLD AUTO: 4.28 X10*3/UL (ref 1.6–5.5)
NEUTROPHILS NFR BLD AUTO: 65.6 %
NRBC BLD-RTO: 0 /100 WBCS (ref 0–0)
PLATELET # BLD AUTO: 253 X10*3/UL (ref 150–450)
POC BILIRUBIN, URINE: NEGATIVE
POC BLOOD, URINE: NEGATIVE
POC GLUCOSE, URINE: NEGATIVE MG/DL
POC KETONES, URINE: NEGATIVE MG/DL
POC LEUKOCYTES, URINE: NEGATIVE
POC NITRITE,URINE: NEGATIVE
POC PH, URINE: 6 PH
POC PROTEIN, URINE: NEGATIVE MG/DL
POC SPECIFIC GRAVITY, URINE: 1.02
POC UROBILINOGEN, URINE: 0.2 EU/DL
POTASSIUM SERPL-SCNC: 4 MMOL/L (ref 3.5–5.3)
PROT SERPL-MCNC: 6.7 G/DL (ref 6.4–8.2)
PROT SERPL-MCNC: 6.9 G/DL (ref 6.4–8.2)
RBC # BLD AUTO: 4.49 X10*6/UL (ref 4–5.2)
SODIUM SERPL-SCNC: 138 MMOL/L (ref 136–145)
TIBC SERPL-MCNC: 304 UG/DL (ref 240–445)
UIBC SERPL-MCNC: 230 UG/DL (ref 110–370)
WBC # BLD AUTO: 6.5 X10*3/UL (ref 4.4–11.3)

## 2024-08-08 PROCEDURE — 36415 COLL VENOUS BLD VENIPUNCTURE: CPT

## 2024-08-08 PROCEDURE — 83521 IG LIGHT CHAINS FREE EACH: CPT

## 2024-08-08 PROCEDURE — 82728 ASSAY OF FERRITIN: CPT

## 2024-08-08 PROCEDURE — 99203 OFFICE O/P NEW LOW 30 MIN: CPT | Performed by: UROLOGY

## 2024-08-08 PROCEDURE — 84155 ASSAY OF PROTEIN SERUM: CPT

## 2024-08-08 PROCEDURE — 85025 COMPLETE CBC W/AUTO DIFF WBC: CPT

## 2024-08-08 PROCEDURE — 1036F TOBACCO NON-USER: CPT | Performed by: UROLOGY

## 2024-08-08 PROCEDURE — 86334 IMMUNOFIX E-PHORESIS SERUM: CPT

## 2024-08-08 PROCEDURE — 80053 COMPREHEN METABOLIC PANEL: CPT

## 2024-08-08 PROCEDURE — 81003 URINALYSIS AUTO W/O SCOPE: CPT | Performed by: UROLOGY

## 2024-08-08 PROCEDURE — 83550 IRON BINDING TEST: CPT

## 2024-08-08 PROCEDURE — 83540 ASSAY OF IRON: CPT

## 2024-08-08 PROCEDURE — 1159F MED LIST DOCD IN RCRD: CPT | Performed by: UROLOGY

## 2024-08-08 PROCEDURE — 84165 PROTEIN E-PHORESIS SERUM: CPT

## 2024-08-08 NOTE — PROGRESS NOTES
08/08/2024  78-year-old female presents incidental finding of kidney stone bilaterally, but asymptomatic.  Voiding well    We discussed kidney stone, stone prevention stone diet, stone symptoms etc.  All the questions were answered, the patient expressed understanding and agreed to the plan.    Impression  Bilateral nonobstructive kidney stone    Plan  Stone education  Follow-up as needed    Chief Complaint   Patient presents with    Nephrolithiasis     Patient here referred by  for kidney stone. Patient needs to establish. No Symptoms now. She states that she didn't even know that she had stones but had testing done and they seen them on imagining.  She had testing done [facility].         Physical Exam     TODAYS LAB RESULTS:      ASSESSMENT&PLAN:      IMPRESSIONS:

## 2024-08-09 LAB
KAPPA LC SERPL-MCNC: 3.14 MG/DL (ref 0.33–1.94)
KAPPA LC/LAMBDA SER: 2 {RATIO} (ref 0.26–1.65)
LAMBDA LC SERPL-MCNC: 1.57 MG/DL (ref 0.57–2.63)

## 2024-08-12 LAB
ALBUMIN: 4.2 G/DL (ref 3.4–5)
ALPHA 1 GLOBULIN: 0.2 G/DL (ref 0.2–0.6)
ALPHA 2 GLOBULIN: 0.7 G/DL (ref 0.4–1.1)
BETA GLOBULIN: 0.8 G/DL (ref 0.5–1.2)
GAMMA GLOBULIN: 1 G/DL (ref 0.5–1.4)
IMMUNOFIXATION COMMENT: ABNORMAL
M-PROTEIN 1: 0.1 G/DL
M-PROTEIN 2: 0.1 G/DL
PATH REVIEW - SERUM IMMUNOFIXATION: ABNORMAL
PATH REVIEW-SERUM PROTEIN ELECTROPHORESIS: ABNORMAL
PROTEIN ELECTROPHORESIS COMMENT: ABNORMAL

## 2024-08-22 ENCOUNTER — OFFICE VISIT (OUTPATIENT)
Dept: HEMATOLOGY/ONCOLOGY | Facility: CLINIC | Age: 79
End: 2024-08-22
Payer: MEDICARE

## 2024-08-22 VITALS
TEMPERATURE: 98.1 F | WEIGHT: 177.03 LBS | HEART RATE: 73 BPM | DIASTOLIC BLOOD PRESSURE: 76 MMHG | RESPIRATION RATE: 16 BRPM | OXYGEN SATURATION: 98 % | BODY MASS INDEX: 29.92 KG/M2 | SYSTOLIC BLOOD PRESSURE: 139 MMHG

## 2024-08-22 DIAGNOSIS — D47.2 MGUS (MONOCLONAL GAMMOPATHY OF UNKNOWN SIGNIFICANCE): ICD-10-CM

## 2024-08-22 PROCEDURE — 1159F MED LIST DOCD IN RCRD: CPT | Performed by: INTERNAL MEDICINE

## 2024-08-22 PROCEDURE — 99213 OFFICE O/P EST LOW 20 MIN: CPT | Performed by: INTERNAL MEDICINE

## 2024-08-22 PROCEDURE — 1126F AMNT PAIN NOTED NONE PRSNT: CPT | Performed by: INTERNAL MEDICINE

## 2024-08-22 RX ORDER — TRIAMCINOLONE ACETONIDE 1 MG/G
CREAM TOPICAL
COMMUNITY
Start: 2024-03-20

## 2024-08-22 RX ORDER — KETOTIFEN FUMARATE 0.35 MG/ML
SOLUTION/ DROPS OPHTHALMIC
COMMUNITY

## 2024-08-22 RX ORDER — MEDROXYPROGESTERONE ACETATE 2.5 MG/1
TABLET ORAL
COMMUNITY

## 2024-08-22 ASSESSMENT — PAIN SCALES - GENERAL: PAINLEVEL: 0-NO PAIN

## 2024-08-22 NOTE — PROGRESS NOTES
LORRAINE SHANKS is a 77 year old Female        Chief Complaint: Monoclonal gammopathy of unknown  significance   Interval History:    Location: [facility]  DX: MGUS  TX: Monitor     august 22, 2024- Today patient presents to clinic for transfer of care from Dr. Norman to Jennifer Ha, APRN-CNP to Dr. Douglass. Last appt with Dr. Norman 2/2023  The patient has not noted fever, chills, drenching night sweats, infectious symptoms, lymphadenopathy, weight loss, bone pain, hemorrhage from any site, melena, or respiratory symptoms. No new issues.   up to date on cancer screenings.      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 Most recently she has been found to have a kappa light chain abnormality as well.   She has never had any progressive changes in the bone, renal insufficiency, anemia, or hypercalcemia. She is not even symptomatic from disease.    FAMILY HISTORY: There is colon cancer in several family members. No underlying hematologic abnormalities have been appreciated within the family. There is a family history of psychiatric disease as well.   PMH: Sjogren's dx, fibromyalgia, hypothyroid, oa, vertigo         Review of Systems:   Review of Systems:    10 point review of systems negative except as state in HPI.                  Allergies and Intolerances:       Allergies:         erythromycin: Drug, Unknown, Active         macrolide antibiotics: Drug Category, Unknown, Active     Outpatient Medication Profile:  * Patient Currently Takes Medications as of 23-Aug-2023 14:21 documented in Structured Notes         estradiol 0.1 mg/g vaginal cream : Last Dose Taken:  , 1 gram(s) intravaginally three nights a week, Start Date: 14-Apr-2023         VITAMIN D 3: Last Dose Taken:  , 2500  buccally         omeprazole 40 mg oral delayed release capsule: Last Dose Taken:  , every  other day          ferrous sulfate 325 mg (65 mg elemental  iron) oral tablet: Last Dose  Taken:  , 1 tab(s) orally every other day         Plaquenil 200 mg oral tablet: Last Dose Taken:  , 1 tab(s) orally once  a day         levothyroxine 50 mcg (0.05 mg) oral tablet: Last Dose Taken:  , 1 tab(s)  orally once a day         Restasis 0.05% ophthalmic emulsion: Last Dose Taken:  , 1 drop(s) to each  affected eye every 12 hours             Medical History:         Abnormal Pap smear of cervix: ICD-10: R87.619, Status: Active         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, Status: Active         Atypical squamous cells of undetermined significance (ASCUS) on Papanicolaou smear of cervix : ICD-10: R87.610, Status: Active         Vaginitis, atrophic: ICD-10: N95.2, Status: Active         Cervical dysplasia, mild: ICD-10: N87.0, Status: Active         Osteopenia: ICD-10: M85.80, Status: Active         Sjogren's syndrome: ICD-10: M35.00, Status: Active         Monoclonal gammopathy of unknown significance: ICD-10: D47.2,  Status: Active         Bipolar I disorder: ICD-10: F31.9, Status: Active         Gastroesophageal reflux disease without esophagitis: ICD-10:  K21.9, Status: Active         Fibromyalgia: ICD-10: M79.7, Status: Active         Acquired hypothyroidism: ICD-10: E03.9, Status: Active         Personal history of immunosuppressive therapy: ICD-10: Z92.25,  Status: Active         HPV test positive: ICD-10: R88.8, Status: Active        Social History:   Social Substance History:  ·  Social History denies smoking, alcohol and drug use   ·  Smoking Status never smoker (1)            Vitals and Measurements:   Vitals: Temp: 36.2  HR: 76  RR: 18  BP: 112/72  SPO2%:   98   Measurements: HT(cm): 164  WT(kg): 71.7  BSA: 1.8   BMI:  26.6   Last 3 Weights & Heights: Date:                           Weight/Scale Type:                    Height:   23-Aug-2023 13:47                71.7  kg                     164  cm  14-Apr-2023 12:57                78.8  kg                     164   cm  27-Feb-2023 10:55                80.2  kg                     164  cm         Physical Exam:      Constitutional: Well developed, awake/alert/oriented  x3, no distress, alert and cooperative      Eyes: clear sclera      ENMT: mucous membranes moist   Head/Neck: Neck supple, no apparent injury, trachea  midline      Respiratory/Thorax: Patent airways, CTAB, normal  breath sounds with good chest expansion, thorax symmetric      Cardiovascular: Regular, rate and rhythm, no murmurs,  normal S 1and S 2      Gastrointestinal: Abdomen soft nontender, nondistended,  + bowel sounds.   No organomegaly      Musculoskeletal: ROM intact, no joint swelling, normal  strength   Extremities: normal extremities, no cyanosis, no  clubbing, no edema      Neurological: Patient is alert and oriented x3. Nonfocal  exam. No myoclonus      Lymphatic: No significant lymphadenopathy   Psychological: Pleasant, appropriate, and easily  engaged      Skin: Warm and dry, no lesions, no rashes         Lab Results:     ·  Results        CBC date/time       WBC     HGB     HCT     PLT     Neut      03-Aug-2023 12:04   5.6     12.7    40.4    244     3.41  21-Feb-2023 11:32   5.5     13.3    42.4    291     3.01  10-Aug-2022 11:57   5.4     13.1    40.4    234     3.39  24-Nov-2021 14:59   6.7     12.1    39.3    306     4.49  18-Aug-2021 15:22   5.4     13.2    39.6    273     3.11  10-Feb-2021 11:35   6.1     12.9    41.9    238     4.17  24-Aug-2020 15:02   5.4     13.3    41.6    223     3.02     BMP date/time       NA              K               CL              CO2             BUN             CREAT             03-Aug-2023 12:04   142             4.2             105             N/A             17              0.82  21-Feb-2023 11:32   142             4.6             107             N/A             20              0.94  21-Feb-2023 11:23   142             4.4             106             N/A             20              0.96  10-Aug-2022 11:56    142             4.6             104             N/A             17              0.97  11-Feb-2022 11:29   143             4.5             107             N/A             17              0.85  24-Nov-2021 14:59   143             4.3             108(H)          N/A             26(H)           0.80  18-Aug-2021 15:22   140             4.4             104             N/A             24(H)           1.03     LDH date/time       LDH     03-Aug-2023 12:04   127  21-Feb-2023 11:32   145  10-Aug-2022 11:56   156  11-Feb-2022 11:29   133  24-Nov-2021 14:59   164  18-Aug-2021 15:22   148  10-Feb-2021 11:35   146        I have reviewed these laboratory results:     Comprehensive Metabolic Panel  03-Aug-2023 12:04:00       Result Value    Glucose, Serum  94    NA  142    K  4.2    CL  105    Bicarbonate, Serum  29    Anion Gap, Serum  12    BUN  17    CREAT  0.82    GFR Female  74    Calcium, Serum  9.7    ALB  4.2    ALKP  79    T Pro  6.7    T Bili  0.6    Alanine Aminotransferase, Serum  9    Aspartate Transaminase, Serum  16       Complete Blood Count + Differential  03-Aug-2023 12:04:00       Result Value    White Blood Cell Count  5.6    Nucleated Erythrocyte Count  0.0    Red Blood Cell Count  4.17    HGB  12.7    HCT  40.4    MCV  97    MCHC  31.4   L   PLT  244    RDW-CV  13.0    Neutrophil %  60.4    Immature Granulocytes %  0.2    Lymphocyte %  29.1    Monocyte %  7.4    Eosinophil %  2.0    Basophil %  0.9    Neutrophil Count  3.41    Lymphocyte Count  1.64    Monocyte Count  0.42    Eosinophil Count  0.11    Basophil Count  0.05       Kappa/Lambda Quant Free Light Chains  03-Aug-2023 12:04:00       Result Value    Free Kappa Light Chains, S  4.23   H   Free Lambda Light Chains, S  1.71    Free Kappa/Lambda Ratio, S  2.47   H      Immunoglobulins (G,A,M)  03-Aug-2023 12:04:00       Result Value    Immunoglobulin G Level, Serum  858    Immunoglobulin A Level, Serum  243    Immunoglobulin M Level, Serum  190       Path  Review SPE  03-Aug-2023 12:04:00       Result Value    Path Review-SPE  BRIONNA By her/his signature above, the Pathologist   listed as making the final interpretation   certifies that she/he has personally reviewed    this case.  ----------------------------------------------       Protein Electrophoresis + IF, Serum.  03-Aug-2023 12:04:00       Result Value    T Pro  6.7    Albumin Serum  4.1    Alpha 1 Globulin  0.2    Alpha 2 Globulin  0.6    Beta Globulin  1.0    Gamma Globulin  0.7    M-Protein 1  0.1   A   M-Protein 2  0.1   A   Interpretation, Electrophoresis  ABNORMAL    Immunofixation Interpretation, Serum  ABNORMAL       Lactate Dehydrogenase, Serum  03-Aug-2023 12:04:00       Result Value    LDH  127       KATARZYNA Path Reveiw  03-Aug-2023 12:04:00       Result Value    Path Review-KATARZYNA  BRIONNA By her/his signature above, the Pathologist   listed as making the final interpretation   certifies that she/he has personally reviewed    this case.  ----------------------------------------------          Assessment and Plan:         Location: [facility]  DX: MGUS  TX: Monitor     8/22/24 - Today patient presents to clinic for transfer of care from Dr. Norman to Jennifer Ha, APRN-CNP. Last appt with Dr. Norman 2/2023.  Last appointment with Jennifer Ha on August 23, 2023     1. MGUS:    Patient has been followed since January 2007 without any progression to myeloma. The changes are in the context of autoimmune process. She met with Dr. Syed who concurred with this assessment and no treatment recommendations were made.  Every 6 mo labs to continue  No findings on exam of concern  Labs stable.     - cbcd normal. cmp - gfr 74, ca 9.7 (nl). albumin 4.1 (nl). flc ratio 2.47. igg ebony normal. spep - no change - Monoclonal IgA-K at 0.1 and Monoclonal IgM-K at 0.1 (M protein 0.2 total)     2. Cancer screening:  She is up to date with mammograms, colonoscopies, and pap smears. Sees derm as well     Plan  1. FUV 6mo  with labs 2 weeks before appt (cbcd, cmp, flc, spep+if, igg ebony) or sooner if needed     I had an extensive discussion with the patient regarding the diagnosis and discussed the plan of therapy, including general considerations regarding side effects and outcomes. Pt understood and gave appropriate teach back about the plan of care. All questions  were answered to the patient's satisfaction. The patient is instructed to contact us at any time if questions or problems arise. Thank you for the opportunity to participate in the care of this very pleasant patient.

## 2024-09-04 ENCOUNTER — TELEPHONE (OUTPATIENT)
Dept: GYNECOLOGIC ONCOLOGY | Facility: HOSPITAL | Age: 79
End: 2024-09-04
Payer: MEDICARE

## 2024-09-04 DIAGNOSIS — R92.30 DENSE BREASTS: ICD-10-CM

## 2024-09-04 DIAGNOSIS — R92.2 DENSE BREASTS: ICD-10-CM

## 2024-09-04 NOTE — TELEPHONE ENCOUNTER
Patient called to request Fast screening breast MRI as recommended following screening mammogram in March showing dense breasts.   Breast MRI recommended 6 months following screening mammogram.   Message routed to Ml Lovelace CNP

## 2025-01-14 ENCOUNTER — OFFICE (OUTPATIENT)
Dept: URBAN - METROPOLITAN AREA CLINIC 15 | Facility: CLINIC | Age: 80
End: 2025-01-14
Payer: MEDICARE

## 2025-01-14 VITALS
WEIGHT: 178 LBS | HEART RATE: 81 BPM | TEMPERATURE: 97.7 F | SYSTOLIC BLOOD PRESSURE: 118 MMHG | HEIGHT: 65 IN | DIASTOLIC BLOOD PRESSURE: 72 MMHG | OXYGEN SATURATION: 98 %

## 2025-01-14 DIAGNOSIS — K44.9 DIAPHRAGMATIC HERNIA WITHOUT OBSTRUCTION OR GANGRENE: ICD-10-CM

## 2025-01-14 DIAGNOSIS — R19.7 DIARRHEA, UNSPECIFIED: ICD-10-CM

## 2025-01-14 DIAGNOSIS — Z86.0100 PERSONAL HISTORY OF COLON POLYPS, UNSPECIFIED: ICD-10-CM

## 2025-01-14 DIAGNOSIS — K57.90 DIVERTICULOSIS OF INTESTINE, PART UNSPECIFIED, WITHOUT PERFO: ICD-10-CM

## 2025-01-14 PROCEDURE — 99214 OFFICE O/P EST MOD 30 MIN: CPT | Performed by: CLINICAL NURSE SPECIALIST

## 2025-02-06 ENCOUNTER — LAB (OUTPATIENT)
Dept: LAB | Facility: CLINIC | Age: 80
End: 2025-02-06
Payer: MEDICARE

## 2025-02-06 DIAGNOSIS — D47.2 MGUS (MONOCLONAL GAMMOPATHY OF UNKNOWN SIGNIFICANCE): ICD-10-CM

## 2025-02-06 LAB
BASOPHILS # BLD AUTO: 0.05 X10*3/UL (ref 0–0.1)
BASOPHILS NFR BLD AUTO: 0.7 %
EOSINOPHIL # BLD AUTO: 0.12 X10*3/UL (ref 0–0.4)
EOSINOPHIL NFR BLD AUTO: 1.7 %
ERYTHROCYTE [DISTWIDTH] IN BLOOD BY AUTOMATED COUNT: 12.4 % (ref 11.5–14.5)
HCT VFR BLD AUTO: 42.7 % (ref 36–46)
HGB BLD-MCNC: 13.8 G/DL (ref 12–16)
IMM GRANULOCYTES # BLD AUTO: 0.01 X10*3/UL (ref 0–0.5)
IMM GRANULOCYTES NFR BLD AUTO: 0.1 % (ref 0–0.9)
LYMPHOCYTES # BLD AUTO: 1.83 X10*3/UL (ref 0.8–3)
LYMPHOCYTES NFR BLD AUTO: 26.4 %
MCH RBC QN AUTO: 29.7 PG (ref 26–34)
MCHC RBC AUTO-ENTMCNC: 32.3 G/DL (ref 32–36)
MCV RBC AUTO: 92 FL (ref 80–100)
MONOCYTES # BLD AUTO: 0.42 X10*3/UL (ref 0.05–0.8)
MONOCYTES NFR BLD AUTO: 6.1 %
NEUTROPHILS # BLD AUTO: 4.5 X10*3/UL (ref 1.6–5.5)
NEUTROPHILS NFR BLD AUTO: 65 %
NRBC BLD-RTO: NORMAL /100{WBCS}
PLATELET # BLD AUTO: 225 X10*3/UL (ref 150–450)
RBC # BLD AUTO: 4.64 X10*6/UL (ref 4–5.2)
WBC # BLD AUTO: 6.9 X10*3/UL (ref 4.4–11.3)

## 2025-02-06 PROCEDURE — 84165 PROTEIN E-PHORESIS SERUM: CPT | Performed by: STUDENT IN AN ORGANIZED HEALTH CARE EDUCATION/TRAINING PROGRAM

## 2025-02-06 PROCEDURE — 86320 SERUM IMMUNOELECTROPHORESIS: CPT | Performed by: STUDENT IN AN ORGANIZED HEALTH CARE EDUCATION/TRAINING PROGRAM

## 2025-02-06 PROCEDURE — 84155 ASSAY OF PROTEIN SERUM: CPT

## 2025-02-06 PROCEDURE — 83521 IG LIGHT CHAINS FREE EACH: CPT

## 2025-02-06 PROCEDURE — 80053 COMPREHEN METABOLIC PANEL: CPT

## 2025-02-06 PROCEDURE — 84165 PROTEIN E-PHORESIS SERUM: CPT

## 2025-02-06 PROCEDURE — 36415 COLL VENOUS BLD VENIPUNCTURE: CPT

## 2025-02-06 PROCEDURE — 85025 COMPLETE CBC W/AUTO DIFF WBC: CPT

## 2025-02-07 LAB
ALBUMIN SERPL BCP-MCNC: 4.5 G/DL (ref 3.4–5)
ALP SERPL-CCNC: 100 U/L (ref 33–136)
ALT SERPL W P-5'-P-CCNC: 10 U/L (ref 7–45)
ANION GAP SERPL CALC-SCNC: 14 MMOL/L (ref 10–20)
AST SERPL W P-5'-P-CCNC: 15 U/L (ref 9–39)
BILIRUB SERPL-MCNC: 0.6 MG/DL (ref 0–1.2)
BUN SERPL-MCNC: 25 MG/DL (ref 6–23)
CALCIUM SERPL-MCNC: 9.8 MG/DL (ref 8.6–10.6)
CHLORIDE SERPL-SCNC: 102 MMOL/L (ref 98–107)
CO2 SERPL-SCNC: 27 MMOL/L (ref 21–32)
CREAT SERPL-MCNC: 0.88 MG/DL (ref 0.5–1.05)
EGFRCR SERPLBLD CKD-EPI 2021: 68 ML/MIN/1.73M*2
GLUCOSE SERPL-MCNC: 96 MG/DL (ref 74–99)
KAPPA LC SERPL-MCNC: 3.75 MG/DL (ref 0.33–1.94)
KAPPA LC/LAMBDA SER: 2.14 {RATIO} (ref 0.26–1.65)
LAMBDA LC SERPL-MCNC: 1.75 MG/DL (ref 0.57–2.63)
POTASSIUM SERPL-SCNC: 4.5 MMOL/L (ref 3.5–5.3)
PROT SERPL-MCNC: 7.2 G/DL (ref 6.4–8.2)
PROT SERPL-MCNC: 7.2 G/DL (ref 6.4–8.2)
SODIUM SERPL-SCNC: 138 MMOL/L (ref 136–145)

## 2025-02-10 LAB
ALBUMIN: 4.4 G/DL (ref 3.4–5)
ALPHA 1 GLOBULIN: 0.2 G/DL (ref 0.2–0.6)
ALPHA 2 GLOBULIN: 0.7 G/DL (ref 0.4–1.1)
BETA GLOBULIN: 1 G/DL (ref 0.5–1.2)
GAMMA GLOBULIN: 0.8 G/DL (ref 0.5–1.4)
IMMUNOFIXATION COMMENT: ABNORMAL
M-PROTEIN 1: 0.2 G/DL
M-PROTEIN 2: 0.1 G/DL
PATH REVIEW - SERUM IMMUNOFIXATION: ABNORMAL
PATH REVIEW-SERUM PROTEIN ELECTROPHORESIS: ABNORMAL
PROTEIN ELECTROPHORESIS COMMENT: ABNORMAL

## 2025-02-21 ENCOUNTER — OFFICE VISIT (OUTPATIENT)
Dept: HEMATOLOGY/ONCOLOGY | Facility: CLINIC | Age: 80
End: 2025-02-21
Payer: MEDICARE

## 2025-02-21 ENCOUNTER — EDUCATION (OUTPATIENT)
Dept: HEMATOLOGY/ONCOLOGY | Facility: CLINIC | Age: 80
End: 2025-02-21

## 2025-02-21 VITALS
OXYGEN SATURATION: 96 % | WEIGHT: 177.03 LBS | HEART RATE: 85 BPM | RESPIRATION RATE: 16 BRPM | SYSTOLIC BLOOD PRESSURE: 122 MMHG | DIASTOLIC BLOOD PRESSURE: 80 MMHG | BODY MASS INDEX: 29.92 KG/M2 | TEMPERATURE: 98.1 F

## 2025-02-21 DIAGNOSIS — D47.2 MGUS (MONOCLONAL GAMMOPATHY OF UNKNOWN SIGNIFICANCE): ICD-10-CM

## 2025-02-21 PROCEDURE — 1126F AMNT PAIN NOTED NONE PRSNT: CPT | Performed by: INTERNAL MEDICINE

## 2025-02-21 PROCEDURE — 99214 OFFICE O/P EST MOD 30 MIN: CPT | Performed by: INTERNAL MEDICINE

## 2025-02-21 PROCEDURE — 1159F MED LIST DOCD IN RCRD: CPT | Performed by: INTERNAL MEDICINE

## 2025-02-21 ASSESSMENT — PAIN SCALES - GENERAL: PAINLEVEL_OUTOF10: 0-NO PAIN

## 2025-02-21 NOTE — PROGRESS NOTES
"Patient seen by Dr. Nino today in clinic. Reinforcement education provided regarding next steps with plan of care.      PER DR. NINO'S FUV NOTE TODAY: \" The patient had come for follow-up on February 21, 2025 regarding history of IgA kappa monoclonal protein.  The patient is doing well denied any new symptoms such as weight loss bone pains constipation confusion lymphadenopathy.  IgG kappa free light chains 3.75 mg/dL on February 6, 2025.  Return in 6 months.\"    Pt will come to [place] lab for blood work prior to next FUV in 6 mos.  Patient verbalizes understanding of plan of care via teachback method.             "

## 2025-02-21 NOTE — PROGRESS NOTES
LORRAINE SHANKS is a 77 year old Female        Chief Complaint: Monoclonal gammopathy of unknown  significance   Interval History:    Location: [facility]  DX: MGUS  TX: Monitor     august 22, 2024- Today patient presents to clinic for transfer of care from Dr. Norman to Jennifer Ha, APRN-CNP to Dr. Douglass. Last appt with Dr. Norman 2/2023  The patient has not noted fever, chills, drenching night sweats, infectious symptoms, lymphadenopathy, weight loss, bone pain, hemorrhage from any site, melena, or respiratory symptoms. No new issues.   up to date on cancer screenings.      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 Most recently she has been found to have a kappa light chain abnormality as well.   She has never had any progressive changes in the bone, renal insufficiency, anemia, or hypercalcemia. She is not even symptomatic from disease.     The patient had come for follow-up on February 21, 2025 regarding history of IgA kappa monoclonal protein.  The patient is doing well denied any new symptoms such as weight loss bone pains constipation confusion lymphadenopathy.     FAMILY HISTORY: There is colon cancer in several family members. No underlying hematologic abnormalities have been appreciated within the family. There is a family history of psychiatric disease as well.   PMH: Sjogren's dx, fibromyalgia, hypothyroid, oa, vertigo         Review of Systems:   Review of Systems:    10 point review of systems negative except as state in HPI.                  Allergies and Intolerances:       Allergies:         erythromycin: Drug, Unknown, Active         macrolide antibiotics: Drug Category, Unknown, Active     Outpatient Medication Profile:  * Patient Currently Takes Medications as of 23-Aug-2023 14:21 documented in Structured Notes         estradiol 0.1 mg/g vaginal cream : Last Dose Taken:  , 1 gram(s) intravaginally three  nights a week, Start Date: 14-Apr-2023         VITAMIN D 3: Last Dose Taken:  , 2500  buccally         omeprazole 40 mg oral delayed release capsule: Last Dose Taken:  , every  other day          ferrous sulfate 325 mg (65 mg elemental iron) oral tablet: Last Dose  Taken:  , 1 tab(s) orally every other day         Plaquenil 200 mg oral tablet: Last Dose Taken:  , 1 tab(s) orally once  a day         levothyroxine 50 mcg (0.05 mg) oral tablet: Last Dose Taken:  , 1 tab(s)  orally once a day         Restasis 0.05% ophthalmic emulsion: Last Dose Taken:  , 1 drop(s) to each  affected eye every 12 hours             Medical History:         Abnormal Pap smear of cervix: ICD-10: R87.619, Status: Active         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, Status: Active         Atypical squamous cells of undetermined significance (ASCUS) on Papanicolaou smear of cervix : ICD-10: R87.610, Status: Active         Vaginitis, atrophic: ICD-10: N95.2, Status: Active         Cervical dysplasia, mild: ICD-10: N87.0, Status: Active         Osteopenia: ICD-10: M85.80, Status: Active         Sjogren's syndrome: ICD-10: M35.00, Status: Active         Monoclonal gammopathy of unknown significance: ICD-10: D47.2,  Status: Active         Bipolar I disorder: ICD-10: F31.9, Status: Active         Gastroesophageal reflux disease without esophagitis: ICD-10:  K21.9, Status: Active         Fibromyalgia: ICD-10: M79.7, Status: Active         Acquired hypothyroidism: ICD-10: E03.9, Status: Active         Personal history of immunosuppressive therapy: ICD-10: Z92.25,  Status: Active         HPV test positive: ICD-10: R88.8, Status: Active        Social History:   Social Substance History:  ·  Social History denies smoking, alcohol and drug use   ·  Smoking Status never smoker (1)            Vitals and Measurements:   Vitals: Temp: 36.2  HR: 76  RR: 18  BP: 112/72  SPO2%:   98   Measurements: HT(cm): 164  WT(kg): 71.7  BSA: 1.8   BMI:  26.6   Last 3  Weights & Heights: Date:                           Weight/Scale Type:                    Height:   23-Aug-2023 13:47                71.7  kg                     164  cm  14-Apr-2023 12:57                78.8  kg                     164  cm  27-Feb-2023 10:55                80.2  kg                     164  cm         Physical Exam:      Constitutional: Well developed, awake/alert/oriented  x3, no distress, alert and cooperative      Eyes: clear sclera      ENMT: mucous membranes moist   Head/Neck: Neck supple, no apparent injury, trachea  midline      Respiratory/Thorax: Patent airways, CTAB, normal  breath sounds with good chest expansion, thorax symmetric      Cardiovascular: Regular, rate and rhythm, no murmurs,  normal S 1and S 2      Gastrointestinal: Abdomen soft nontender, nondistended,  + bowel sounds.   No organomegaly      Musculoskeletal: ROM intact, no joint swelling, normal  strength   Extremities: normal extremities, no cyanosis, no  clubbing, no edema      Neurological: Patient is alert and oriented x3. Nonfocal  exam. No myoclonus      Lymphatic: No significant lymphadenopathy   Psychological: Pleasant, appropriate, and easily  engaged      Skin: Warm and dry, no lesions, no rashes         Lab Results:     ·  Results        CBC date/time       WBC     HGB     HCT     PLT     Neut      03-Aug-2023 12:04   5.6     12.7    40.4    244     3.41  21-Feb-2023 11:32   5.5     13.3    42.4    291     3.01  10-Aug-2022 11:57   5.4     13.1    40.4    234     3.39  24-Nov-2021 14:59   6.7     12.1    39.3    306     4.49  18-Aug-2021 15:22   5.4     13.2    39.6    273     3.11  10-Feb-2021 11:35   6.1     12.9    41.9    238     4.17  24-Aug-2020 15:02   5.4     13.3    41.6    223     3.02     BMP date/time       NA              K               CL              CO2             BUN             CREAT             03-Aug-2023 12:04   142             4.2             105             N/A             17               0.82  21-Feb-2023 11:32   142             4.6             107             N/A             20              0.94  21-Feb-2023 11:23   142             4.4             106             N/A             20              0.96  10-Aug-2022 11:56   142             4.6             104             N/A             17              0.97  11-Feb-2022 11:29   143             4.5             107             N/A             17              0.85  24-Nov-2021 14:59   143             4.3             108(H)          N/A             26(H)           0.80  18-Aug-2021 15:22   140             4.4             104             N/A             24(H)           1.03     LDH date/time       LDH     03-Aug-2023 12:04   127  21-Feb-2023 11:32   145  10-Aug-2022 11:56   156  11-Feb-2022 11:29   133  24-Nov-2021 14:59   164  18-Aug-2021 15:22   148  10-Feb-2021 11:35   146        I have reviewed these laboratory results:     Comprehensive Metabolic Panel  03-Aug-2023 12:04:00       Result Value    Glucose, Serum  94    NA  142    K  4.2    CL  105    Bicarbonate, Serum  29    Anion Gap, Serum  12    BUN  17    CREAT  0.82    GFR Female  74    Calcium, Serum  9.7    ALB  4.2    ALKP  79    T Pro  6.7    T Bili  0.6    Alanine Aminotransferase, Serum  9    Aspartate Transaminase, Serum  16       Complete Blood Count + Differential  03-Aug-2023 12:04:00       Result Value    White Blood Cell Count  5.6    Nucleated Erythrocyte Count  0.0    Red Blood Cell Count  4.17    HGB  12.7    HCT  40.4    MCV  97    MCHC  31.4   L   PLT  244    RDW-CV  13.0    Neutrophil %  60.4    Immature Granulocytes %  0.2    Lymphocyte %  29.1    Monocyte %  7.4    Eosinophil %  2.0    Basophil %  0.9    Neutrophil Count  3.41    Lymphocyte Count  1.64    Monocyte Count  0.42    Eosinophil Count  0.11    Basophil Count  0.05       Kappa/Lambda Quant Free Light Chains  03-Aug-2023 12:04:00       Result Value    Free Kappa Light Chains, S  4.23   H   Free Lambda Light Chains, S   1.71    Free Kappa/Lambda Ratio, S  2.47   H      Immunoglobulins (G,A,M)  03-Aug-2023 12:04:00       Result Value    Immunoglobulin G Level, Serum  858    Immunoglobulin A Level, Serum  243    Immunoglobulin M Level, Serum  190       Path Review SPE  03-Aug-2023 12:04:00       Result Value    Path Review-SPE  BRIONNA By her/his signature above, the Pathologist   listed as making the final interpretation   certifies that she/he has personally reviewed    this case.  ----------------------------------------------       Protein Electrophoresis + IF, Serum.  03-Aug-2023 12:04:00       Result Value    T Pro  6.7    Albumin Serum  4.1    Alpha 1 Globulin  0.2    Alpha 2 Globulin  0.6    Beta Globulin  1.0    Gamma Globulin  0.7    M-Protein 1  0.1   A   M-Protein 2  0.1   A   Interpretation, Electrophoresis  ABNORMAL    Immunofixation Interpretation, Serum  ABNORMAL       Lactate Dehydrogenase, Serum  03-Aug-2023 12:04:00       Result Value    LDH  127       KATARZYNA Path Reveiw  03-Aug-2023 12:04:00       Result Value    Path Review-KATARZYNA  BRIONNA By her/his signature above, the Pathologist   listed as making the final interpretation   certifies that she/he has personally reviewed    this case.  ----------------------------------------------          Assessment and Plan:         Location: [facility]  DX: MGUS  TX: Monitor     8/22/24 - Today patient presents to clinic for transfer of care from Dr. Norman to Jennifer Ha, APRN-CNP. Last appt with Dr. Norman 2/2023.  Last appointment with Jennifer Ha on August 23, 2023     1. MGUS:    Patient has been followed since January 2007 without any progression to myeloma. The changes are in the context of autoimmune process. She met with Dr. Syed who concurred with this assessment and no treatment recommendations were made.  Every 6 mo labs to continue  No findings on exam of concern  Labs stable.     - cbcd normal. cmp - gfr 74, ca 9.7 (nl). albumin 4.1 (nl). flc ratio 2.47.  igg ebony normal. spep - no change - Monoclonal IgA-K at 0.1 and Monoclonal IgM-K at 0.1 (M protein 0.2 total)    The patient had come for follow-up on February 21, 2025 regarding history of IgA kappa monoclonal protein.  The patient is doing well denied any new symptoms such as weight loss bone pains constipation confusion lymphadenopathy.  IgG kappa free light chains 3.75 mg/dL on February 6, 2025.  Return in 6 months.     2. Cancer screening:  She is up to date with mammograms, colonoscopies, and pap smears. Sees derm as well     Plan  1. FUV 6mo with labs 2 weeks before appt (cbcd, cmp, flc, spep+if, igg ebony) or sooner if needed     I had an extensive discussion with the patient regarding the diagnosis and discussed the plan of therapy, including general considerations regarding side effects and outcomes. Pt understood and gave appropriate teach back about the plan of care. All questions  were answered to the patient's satisfaction. The patient is instructed to contact us at any time if questions or problems arise. Thank you for the opportunity to participate in the care of this very pleasant patient.

## 2025-05-05 ENCOUNTER — TELEPHONE (OUTPATIENT)
Dept: HEMATOLOGY/ONCOLOGY | Facility: CLINIC | Age: 80
End: 2025-05-05
Payer: MEDICARE

## 2025-05-05 ENCOUNTER — TELEPHONE (OUTPATIENT)
Dept: HEMATOLOGY/ONCOLOGY | Facility: CLINIC | Age: 80
End: 2025-05-05

## 2025-05-05 ENCOUNTER — HOSPITAL ENCOUNTER (OUTPATIENT)
Dept: RADIOLOGY | Facility: CLINIC | Age: 80
Discharge: HOME | End: 2025-05-05
Payer: MEDICARE

## 2025-05-05 VITALS — WEIGHT: 177.03 LBS | BODY MASS INDEX: 30.22 KG/M2 | HEIGHT: 64 IN

## 2025-05-05 DIAGNOSIS — Z12.31 ENCOUNTER FOR SCREENING MAMMOGRAM FOR MALIGNANT NEOPLASM OF BREAST: ICD-10-CM

## 2025-05-05 PROCEDURE — 77067 SCR MAMMO BI INCL CAD: CPT | Performed by: RADIOLOGY

## 2025-05-05 PROCEDURE — 77063 BREAST TOMOSYNTHESIS BI: CPT | Performed by: RADIOLOGY

## 2025-05-05 PROCEDURE — 77067 SCR MAMMO BI INCL CAD: CPT

## 2025-05-05 NOTE — TELEPHONE ENCOUNTER
Patient has mammo today. Needs order message sent to dr santo and nurse.  Will watch for order to be placed.    Spoke with patient regarding. She is thankful.

## 2025-05-05 NOTE — TELEPHONE ENCOUNTER
Dr. Douglass patient. Patient called she is going to get her mammogram done today @ 2pm but she needs an order put in because they told her one was not in the system. Any questions can call her @ [phone]

## 2025-05-21 NOTE — PROGRESS NOTES
Patient ID: Camille Mcmahan is a 79 y.o. female.  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 Final pathology results c/w CESARIO 1 with positive ectocervical margin.      Patient consistently HPV negative since 4/2014.     11/10/16 pap smear- rare Ascus/HPV positive    1/5/17 colposcopy - [facility]: Squamous epithelium with no significant pathologic findings. Endocervical tissue not identified.     7/6/17 pap smear - ASCUS/HPV positive     2/1/18 pap smear - ASCUS/HPV positive    8/2018 colposcopy-[facility]: CESARIO 1,  no ectocervical aceto white changes. Pap: ASCUS/HPV positive       Objective    Visit Vitals  /76   Pulse 72   Temp 36.2 °C (97.2 °F)   Resp 16     Interval history: Patient is a 79 year old female with a history of CESARIO 1 treated with LEEP in 6/2013.  Here for annual pap.  Last pap 5/2024 was ASCUS, HPV-.   Patient having arthritis, starting PT.  Patient has Sjogren's on Plaquenil.  Mammogram is up to date.  Patient following [employer], has lost some weight.  Patient is not currently sexually active. Denies any bowel or bladder issues.  Denies any vaginal bleeding or pink tinged discharge.       Physical Exam:    Constitutional: Doing well. KENDRA  Eyes: PERRL  ENMT: Moist mucus membranes  Head/Neck: Supple. Symmetrical  Cardiovascular: Regular, rate and rhythm. 2+ equal pulses of the extremities  Respiratory/Thorax: CTA. RRR. Chest rise symmetrical.  Gastrointestinal: Non-distended, soft, non-tender  Genitourinary: Cervical borders are obliterated, cervical os visualized, no lesions noted.  Atrophic changes along vaginal walls and introitus.  Small mobile uterus, no CMT  Musculoskeletal: ROM intact, no joint swelling, normal strength  Extremities: No edema  Neurological: Alert and oriented x 3. Pleasant and cooperative.  Lymphatic: No lymphadenopathy. No lymphedema  Psychological: Appropriate mood and  behavior  Skin: Warm and dry, no lesions, no rashes    A complete review of systems was performed and all systems were normal except what is noted in the interval history.      Assessment/Plan   Patient is a 79 year old female with a history of CESARIO 1 treated with LEEP in 6/2013, stable LGSIL changes.  Well woman exam.        PLAN:  Pap today, F/U results  If negative F/U in 1 year or as needed  Mammogram order

## 2025-05-23 ENCOUNTER — OFFICE VISIT (OUTPATIENT)
Dept: GYNECOLOGIC ONCOLOGY | Facility: CLINIC | Age: 80
End: 2025-05-23
Payer: MEDICARE

## 2025-05-23 VITALS
HEART RATE: 72 BPM | TEMPERATURE: 97.2 F | OXYGEN SATURATION: 97 % | BODY MASS INDEX: 29.33 KG/M2 | SYSTOLIC BLOOD PRESSURE: 127 MMHG | WEIGHT: 173.5 LBS | DIASTOLIC BLOOD PRESSURE: 76 MMHG | RESPIRATION RATE: 16 BRPM

## 2025-05-23 DIAGNOSIS — Z12.31 SCREENING MAMMOGRAM, ENCOUNTER FOR: Primary | ICD-10-CM

## 2025-05-23 DIAGNOSIS — N87.9 CERVICAL DYSPLASIA: ICD-10-CM

## 2025-05-23 PROCEDURE — 99213 OFFICE O/P EST LOW 20 MIN: CPT | Performed by: NURSE PRACTITIONER

## 2025-05-23 PROCEDURE — 1159F MED LIST DOCD IN RCRD: CPT | Performed by: NURSE PRACTITIONER

## 2025-05-23 PROCEDURE — 1126F AMNT PAIN NOTED NONE PRSNT: CPT | Performed by: NURSE PRACTITIONER

## 2025-05-23 ASSESSMENT — PAIN SCALES - GENERAL: PAINLEVEL_OUTOF10: 0-NO PAIN

## 2025-06-06 ENCOUNTER — TELEPHONE (OUTPATIENT)
Dept: GYNECOLOGIC ONCOLOGY | Facility: HOSPITAL | Age: 80
End: 2025-06-06
Payer: MEDICARE

## 2025-06-06 ENCOUNTER — TELEPHONE (OUTPATIENT)
Dept: ADMISSION | Facility: HOSPITAL | Age: 80
End: 2025-06-06
Payer: MEDICARE

## 2025-06-06 NOTE — TELEPHONE ENCOUNTER
Camille called and said she missed a call from Ml Lovelace and is asking she return her call. I told her I didn't cover this provider but would transfer her now and gave direct number to Gyn/Onc.

## 2025-06-06 NOTE — TELEPHONE ENCOUNTER
Phoned patient to notify that pap results showed LGSIL/-HPV and are stable.  Notified patient that Ml Lovelace CNP recommends follow up in 1 year.   Patient verbalized her understanding of information given.

## 2025-08-20 ENCOUNTER — TELEPHONE (OUTPATIENT)
Dept: HEMATOLOGY/ONCOLOGY | Facility: CLINIC | Age: 80
End: 2025-08-20
Payer: MEDICARE

## 2025-08-21 ENCOUNTER — LAB (OUTPATIENT)
Dept: LAB | Facility: CLINIC | Age: 80
End: 2025-08-21
Payer: MEDICARE

## 2025-08-21 ENCOUNTER — APPOINTMENT (OUTPATIENT)
Dept: HEMATOLOGY/ONCOLOGY | Facility: CLINIC | Age: 80
End: 2025-08-21
Payer: MEDICARE

## 2025-08-21 DIAGNOSIS — D47.2 MGUS (MONOCLONAL GAMMOPATHY OF UNKNOWN SIGNIFICANCE): ICD-10-CM

## 2025-08-21 LAB
ALBUMIN SERPL BCP-MCNC: 4.4 G/DL (ref 3.4–5)
ALP SERPL-CCNC: 95 U/L (ref 33–136)
ALT SERPL W P-5'-P-CCNC: 11 U/L (ref 7–45)
ANION GAP SERPL CALC-SCNC: 13 MMOL/L (ref 10–20)
AST SERPL W P-5'-P-CCNC: 16 U/L (ref 9–39)
BASOPHILS # BLD AUTO: 0.05 X10*3/UL (ref 0–0.1)
BASOPHILS NFR BLD AUTO: 0.8 %
BILIRUB SERPL-MCNC: 0.5 MG/DL (ref 0–1.2)
BUN SERPL-MCNC: 19 MG/DL (ref 6–23)
CALCIUM SERPL-MCNC: 9.6 MG/DL (ref 8.6–10.6)
CHLORIDE SERPL-SCNC: 102 MMOL/L (ref 98–107)
CO2 SERPL-SCNC: 30 MMOL/L (ref 21–32)
CREAT SERPL-MCNC: 0.8 MG/DL (ref 0.5–1.05)
EGFRCR SERPLBLD CKD-EPI 2021: 76 ML/MIN/1.73M*2
EOSINOPHIL # BLD AUTO: 0.13 X10*3/UL (ref 0–0.4)
EOSINOPHIL NFR BLD AUTO: 2.2 %
ERYTHROCYTE [DISTWIDTH] IN BLOOD BY AUTOMATED COUNT: 12.9 % (ref 11.5–14.5)
GLUCOSE SERPL-MCNC: 99 MG/DL (ref 74–99)
HCT VFR BLD AUTO: 40.6 % (ref 36–46)
HGB BLD-MCNC: 13.2 G/DL (ref 12–16)
IMM GRANULOCYTES # BLD AUTO: 0 X10*3/UL (ref 0–0.5)
IMM GRANULOCYTES NFR BLD AUTO: 0 % (ref 0–0.9)
LYMPHOCYTES # BLD AUTO: 1.82 X10*3/UL (ref 0.8–3)
LYMPHOCYTES NFR BLD AUTO: 30.2 %
MCH RBC QN AUTO: 30 PG (ref 26–34)
MCHC RBC AUTO-ENTMCNC: 32.5 G/DL (ref 32–36)
MCV RBC AUTO: 92 FL (ref 80–100)
MONOCYTES # BLD AUTO: 0.46 X10*3/UL (ref 0.05–0.8)
MONOCYTES NFR BLD AUTO: 7.6 %
NEUTROPHILS # BLD AUTO: 3.57 X10*3/UL (ref 1.6–5.5)
NEUTROPHILS NFR BLD AUTO: 59.2 %
NRBC BLD-RTO: NORMAL /100{WBCS}
PLATELET # BLD AUTO: 237 X10*3/UL (ref 150–450)
POTASSIUM SERPL-SCNC: 4.5 MMOL/L (ref 3.5–5.3)
PROT SERPL-MCNC: 6.8 G/DL (ref 6.4–8.2)
PROT SERPL-MCNC: 6.8 G/DL (ref 6.4–8.2)
RBC # BLD AUTO: 4.4 X10*6/UL (ref 4–5.2)
SODIUM SERPL-SCNC: 140 MMOL/L (ref 136–145)
WBC # BLD AUTO: 6 X10*3/UL (ref 4.4–11.3)

## 2025-08-21 PROCEDURE — 80053 COMPREHEN METABOLIC PANEL: CPT

## 2025-08-21 PROCEDURE — 83521 IG LIGHT CHAINS FREE EACH: CPT

## 2025-08-21 PROCEDURE — 84075 ASSAY ALKALINE PHOSPHATASE: CPT

## 2025-08-21 PROCEDURE — 86334 IMMUNOFIX E-PHORESIS SERUM: CPT

## 2025-08-21 PROCEDURE — 85025 COMPLETE CBC W/AUTO DIFF WBC: CPT

## 2025-08-21 PROCEDURE — 36415 COLL VENOUS BLD VENIPUNCTURE: CPT

## 2025-08-22 LAB
KAPPA LC SERPL-MCNC: 3.48 MG/DL (ref 0.33–1.94)
KAPPA LC/LAMBDA SER: 2.06 {RATIO} (ref 0.26–1.65)
LAMBDA LC SERPL-MCNC: 1.69 MG/DL (ref 0.57–2.63)

## 2025-08-26 LAB
ALBUMIN: 4.2 G/DL (ref 3.4–5)
ALPHA 1 GLOBULIN: 0.2 G/DL (ref 0.2–0.6)
ALPHA 2 GLOBULIN: 0.7 G/DL (ref 0.4–1.1)
BETA GLOBULIN: 0.9 G/DL (ref 0.5–1.2)
GAMMA GLOBULIN: 0.8 G/DL (ref 0.5–1.4)
IMMUNOFIXATION COMMENT: ABNORMAL
M-PROTEIN 1: 0.2 G/DL (ref ?–0)
M-PROTEIN 2: 0.1 G/DL (ref ?–0)
PATH REVIEW - SERUM IMMUNOFIXATION: ABNORMAL
PATH REVIEW-SERUM PROTEIN ELECTROPHORESIS: ABNORMAL
PROTEIN ELECTROPHORESIS COMMENT: ABNORMAL

## 2025-09-05 ENCOUNTER — APPOINTMENT (OUTPATIENT)
Dept: HEMATOLOGY/ONCOLOGY | Facility: CLINIC | Age: 80
End: 2025-09-05
Payer: MEDICARE